# Patient Record
Sex: FEMALE | Race: WHITE | Employment: OTHER | URBAN - METROPOLITAN AREA
[De-identification: names, ages, dates, MRNs, and addresses within clinical notes are randomized per-mention and may not be internally consistent; named-entity substitution may affect disease eponyms.]

---

## 2017-01-03 ENCOUNTER — MEDICAL CORRESPONDENCE (OUTPATIENT)
Dept: HEALTH INFORMATION MANAGEMENT | Facility: CLINIC | Age: 77
End: 2017-01-03

## 2017-01-04 ENCOUNTER — TELEPHONE (OUTPATIENT)
Dept: NEUROLOGY | Facility: CLINIC | Age: 77
End: 2017-01-04

## 2017-01-04 ENCOUNTER — MEDICAL CORRESPONDENCE (OUTPATIENT)
Dept: HEALTH INFORMATION MANAGEMENT | Facility: CLINIC | Age: 77
End: 2017-01-04

## 2017-01-04 NOTE — TELEPHONE ENCOUNTER
----- Message from Sarah Trinh RN sent at 12/27/2016 12:15 PM CST -----  Regarding: [facility] is sending you a fax     Requesting order for WC  Please fill out and return   Thank you.

## 2017-01-05 ENCOUNTER — MEDICAL CORRESPONDENCE (OUTPATIENT)
Dept: HEALTH INFORMATION MANAGEMENT | Facility: CLINIC | Age: 77
End: 2017-01-05

## 2017-02-14 ENCOUNTER — TELEPHONE (OUTPATIENT)
Dept: NEUROLOGY | Facility: CLINIC | Age: 77
End: 2017-02-14

## 2017-02-14 ENCOUNTER — OFFICE VISIT (OUTPATIENT)
Dept: NEUROLOGY | Facility: CLINIC | Age: 77
End: 2017-02-14

## 2017-02-14 VITALS
SYSTOLIC BLOOD PRESSURE: 117 MMHG | DIASTOLIC BLOOD PRESSURE: 57 MMHG | HEART RATE: 84 BPM | WEIGHT: 201.2 LBS | BODY MASS INDEX: 31.58 KG/M2 | HEIGHT: 67 IN

## 2017-02-14 DIAGNOSIS — R41.89 COGNITIVE IMPAIRMENT: ICD-10-CM

## 2017-02-14 DIAGNOSIS — G20.A1 PARKINSON DISEASE (H): Primary | ICD-10-CM

## 2017-02-14 RX ORDER — CARBIDOPA AND LEVODOPA 25; 100 MG/1; MG/1
0.5 TABLET ORAL 4 TIMES DAILY
Qty: 180 TABLET | Refills: 3 | Status: SHIPPED
Start: 2017-02-14 | End: 2017-08-15

## 2017-02-14 RX ORDER — CARBIDOPA AND LEVODOPA 25; 100 MG/1; MG/1
0.5 TABLET ORAL 2 TIMES DAILY PRN
Qty: 180 TABLET | Refills: 3 | Status: SHIPPED
Start: 2017-02-14 | End: 2018-01-18

## 2017-02-14 RX ORDER — LUTEIN 6 MG
20 TABLET ORAL DAILY
Qty: 90 TABLET | Refills: 3 | COMMUNITY
Start: 2017-02-14 | End: 2018-11-15

## 2017-02-14 RX ORDER — MIRTAZAPINE 15 MG/1
7.5 TABLET, FILM COATED ORAL AT BEDTIME
Qty: 30 TABLET | COMMUNITY
Start: 2017-02-14 | End: 2018-11-15

## 2017-02-14 RX ORDER — RIVASTIGMINE 9.5 MG/24H
PATCH, EXTENDED RELEASE TRANSDERMAL
Qty: 30 PATCH | Refills: 11 | Status: ON HOLD | OUTPATIENT
Start: 2017-02-14 | End: 2018-03-02

## 2017-02-14 RX ORDER — RIVASTIGMINE 9.5 MG/24H
PATCH, EXTENDED RELEASE TRANSDERMAL
Qty: 30 PATCH | Refills: 11 | Status: SHIPPED
Start: 2017-02-14 | End: 2017-02-14

## 2017-02-14 RX ORDER — CARBIDOPA AND LEVODOPA 50; 200 MG/1; MG/1
1 TABLET, EXTENDED RELEASE ORAL EVERY 6 HOURS
Qty: 360 TABLET | Refills: 3 | Status: SHIPPED
Start: 2017-02-14 | End: 2017-08-16

## 2017-02-14 RX ORDER — CARBIDOPA AND LEVODOPA 50; 200 MG/1; MG/1
1 TABLET, EXTENDED RELEASE ORAL EVERY 6 HOURS
Qty: 360 TABLET | Refills: 3 | Status: SHIPPED | OUTPATIENT
Start: 2017-02-14 | End: 2017-02-14

## 2017-02-14 RX ORDER — CARBIDOPA AND LEVODOPA 25; 100 MG/1; MG/1
0.5 TABLET ORAL 4 TIMES DAILY
Qty: 180 TABLET | Refills: 3 | Status: SHIPPED | OUTPATIENT
Start: 2017-02-14 | End: 2017-02-14

## 2017-02-14 RX ORDER — RIVASTIGMINE 4.6 MG/24H
PATCH, EXTENDED RELEASE TRANSDERMAL
Qty: 30 PATCH | Refills: 11 | Status: SHIPPED | OUTPATIENT
Start: 2017-02-14 | End: 2017-03-28 | Stop reason: DRUGHIGH

## 2017-02-14 RX ORDER — CARBIDOPA AND LEVODOPA 25; 100 MG/1; MG/1
0.5 TABLET ORAL 2 TIMES DAILY PRN
Qty: 180 TABLET | Refills: 3 | Status: SHIPPED | OUTPATIENT
Start: 2017-02-14 | End: 2017-02-14

## 2017-02-14 RX ORDER — LUTEIN 6 MG
20 TABLET ORAL DAILY
COMMUNITY
Start: 2017-01-04 | End: 2017-02-14

## 2017-02-14 RX ORDER — RIVASTIGMINE 13.3 MG/24H
1 PATCH, EXTENDED RELEASE TRANSDERMAL DAILY
Qty: 30 PATCH | Refills: 11 | Status: SHIPPED | OUTPATIENT
Start: 2017-02-14 | End: 2017-08-15 | Stop reason: DRUGHIGH

## 2017-02-14 RX ORDER — DONEPEZIL HYDROCHLORIDE 5 MG/1
TABLET, FILM COATED ORAL
Qty: 30 TABLET | Refills: 11 | Status: SHIPPED | OUTPATIENT
Start: 2017-02-14 | End: 2017-03-28

## 2017-02-14 RX ORDER — ACETAMINOPHEN 325 MG/1
325 TABLET ORAL EVERY 4 HOURS PRN
Qty: 100 TABLET | COMMUNITY
Start: 2017-02-14 | End: 2019-05-23

## 2017-02-14 RX ORDER — RIVASTIGMINE 4.6 MG/24H
PATCH, EXTENDED RELEASE TRANSDERMAL
Qty: 30 PATCH | Refills: 11 | Status: SHIPPED | OUTPATIENT
Start: 2017-02-14 | End: 2017-02-14

## 2017-02-14 ASSESSMENT — PAIN SCALES - GENERAL: PAINLEVEL: NO PAIN (0)

## 2017-02-14 ASSESSMENT — ENCOUNTER SYMPTOMS
CONSTIPATION: 1
FATIGUE: 1
ARTHRALGIAS: 1
LEG PAIN: 1
DECREASED CONCENTRATION: 1
ALTERED TEMPERATURE REGULATION: 0
HEARTBURN: 0
INSOMNIA: 1
DIARRHEA: 0
HEADACHES: 1
HYPERTENSION: 0
POSTURAL DYSPNEA: 1
SPUTUM PRODUCTION: 0
WEAKNESS: 1
WEIGHT LOSS: 0
LEG SWELLING: 1
JOINT SWELLING: 0
RESPIRATORY PAIN: 0
STIFFNESS: 1
HEMOPTYSIS: 0
HEMATURIA: 0
DYSPNEA ON EXERTION: 1
SHORTNESS OF BREATH: 1
HYPOTENSION: 0
COUGH DISTURBING SLEEP: 0
TREMORS: 1
EXERCISE INTOLERANCE: 1
MYALGIAS: 1
DEPRESSION: 1
BOWEL INCONTINENCE: 0
POLYDIPSIA: 0
WEIGHT GAIN: 0
PANIC: 0
RECTAL PAIN: 0
VOMITING: 0
NERVOUS/ANXIOUS: 1
SLEEP DISTURBANCES DUE TO BREATHING: 1
PARALYSIS: 0
ABDOMINAL PAIN: 0
SPEECH CHANGE: 1
RECTAL BLEEDING: 1
CHILLS: 0
BLOOD IN STOOL: 0
DYSURIA: 0
NAUSEA: 0
LOSS OF CONSCIOUSNESS: 0
POLYPHAGIA: 0
BACK PAIN: 0
COUGH: 0
BLOATING: 0
SNORES LOUDLY: 1
FEVER: 0
MEMORY LOSS: 1
CLAUDICATION: 0
DIFFICULTY URINATING: 0
ORTHOPNEA: 1
LIGHT-HEADEDNESS: 1
SYNCOPE: 0
TACHYCARDIA: 0
SEIZURES: 0
MUSCLE WEAKNESS: 1
HALLUCINATIONS: 0
JAUNDICE: 0
FLANK PAIN: 0
PALPITATIONS: 0
NECK PAIN: 1
DIZZINESS: 1
TINGLING: 0
INCREASED ENERGY: 1
DISTURBANCES IN COORDINATION: 1
DECREASED APPETITE: 0
NIGHT SWEATS: 0
MUSCLE CRAMPS: 1
NUMBNESS: 0
WHEEZING: 0
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
## 2017-02-14 NOTE — MR AVS SNAPSHOT
After Visit Summary   2017    Eduarda Lazar    MRN: [id number]           Patient Information     Date Of Birth          1940        Visit Information        Provider Department      2017 9:10 AM Marcelo Cardoza MD [facility]        Today's Diagnoses     Parkinson disease (H)    -  1    Cognitive impairment          Care Instructions    PATIENT: Eduarda Lazar    : 1940    ANDREE: 2017    Parkinson  dbs    Sinemet CR 1 tablet @ 4am, 10am, 4pm and 10pm   Sinemet 25/100 1/2 tab @ 4am, 10am, 4pm and 10m and t tablet twice daily as needed.     Lutein    Ed visit 2016.    ramin Kruse, eduardo are visiting.  Aixa is 10 minutes away and visits a couple times per week -  and   Ramin is visiting a couple times per week  Eduardo is visiting as well.     She is here today with w/c for distances  She is using a walker.   She rarely uses the w/c in the nursing home.  She is getting more exercise there.   She has had problems grasping words and has lost her ability to use the phone or control the remote.     PLAN    1. Rivastigmine trial  Apply 4.6mg patch daily for 4 weeks then 9.5mg patch daily for 4 weeks then 13.3 mg patch daily    2. Consider aricept/donepezil if unable to cover the rivastigmine which may be better tolerated as a patch than the pills    3. Discussed lack of data for medical marijuana and that it is not one of the certifiable conditions.    4. Return to see raul in 6 months    5. dbs device is stable today.         Answers for HPI/ROS submitted by the patient on 2017   General Symptoms: Yes  Skin Symptoms: No  HENT Symptoms: No  EYE SYMPTOMS: No  HEART SYMPTOMS: Yes  LUNG SYMPTOMS: Yes  INTESTINAL SYMPTOMS: Yes  URINARY SYMPTOMS: Yes  GYNECOLOGIC SYMPTOMS: No  BREAST SYMPTOMS: No  SKELETAL SYMPTOMS: Yes  BLOOD SYMPTOMS: No  NERVOUS SYSTEM SYMPTOMS: Yes  MENTAL HEALTH SYMPTOMS: Yes  Fever: No  Loss of appetite:  No  Weight loss: No  Weight gain: No  Fatigue: Yes  Night sweats: No  Chills: No  Increased stress: Yes  Excessive hunger: No  Excessive thirst: No  Feeling hot or cold when others believe the temperature is normal: No  Loss of height: No  Post-operative complications: No  Surgical site pain: No  Hallucinations: No  Change in or Loss of Energy: Yes  Hyperactivity: No  Confusion: Yes  Cough: No  Sputum or phlegm: No  Coughing up blood: No  Difficulty breating or shortness of breath: Yes  Snoring: Yes  Wheezing: No  Difficulty breathing on exertion: Yes  Respiratory pain: No  Nighttime Cough: No  Difficulty breathing when lying flat: Yes  Chest pain or pressure: No  Fast or irregular heartbeat: No  Pain in legs with walking: Yes  Swelling in feet or ankles: Yes  Trouble breathing while lying down: Yes  Fingers or Toes appear blue: No  High blood pressure: No  Low blood pressure: No  Fainting: No  Murmurs: No  Chest pain on exertion: No  Chest pain at rest: No  Cramping pain in leg during exercise: No  Pacemaker: No  Varicose veins: No  Edema or swelling: Yes  Fast heart beat: No  Wake up at night with shortness of breath: Yes  Heart flutters: No  Light-headedness: Yes  Exercise intolerance: Yes  Heart burn or indigestion: No  Nausea: No  Vomiting: No  Abdominal pain: No  Bloating: No  Constipation: Yes  Diarrhea: No  Blood in stool: No  Black stools: No  Rectal or Anal pain: No  Fecal incontinence: No  Rectal bleeding: Yes  Yellowing of skin or eyes: No  Vomit with blood: No  Change in stools: No  Hemorrhoids: Yes  Trouble holding urine or incontinence: Yes  Pain or burning: No  Trouble starting or stopping: Yes  Increased frequency of urination: Yes  Blood in urine: No  Decreased frequency of urination: No  Frequent nighttime urination: Yes  Flank pain: No  Difficulty emptying bladder: No  Back pain: No  Muscle aches: Yes  Neck pain: Yes  Swollen joints: No  Joint pain: Yes  Bone pain: No  Muscle cramps: Yes  Muscle  weakness: Yes  Joint stiffness: Yes  Bone fracture: No  Trouble with coordination: Yes  Dizziness or trouble with balance: Yes  Fainting or black-out spells: No  Memory loss: Yes  Headache: Yes  Seizures: No  Speech problems: Yes  Tingling: No  Tremor: Yes  Weakness: Yes  Difficulty walking: Yes  Paralysis: No  Numbness: No  Nervous or Anxious: Yes  Depression: Yes  Trouble sleeping: Yes  Trouble thinking or concentrating: Yes  Mood changes: Yes  Panic attacks: No          Follow-ups after your visit        Follow-up notes from your care team     Return in about 6 months (around 8/14/2017).      Your next 10 appointments already scheduled     Aug 15, 2017 11:15 AM CDT   (Arrive by 11:00 AM)   Return Movement Disorder with MIRZA Patel [facility] ([facility])    [address] [place] [id number] [phone]              Who to contact     Please call your clinic at [phone] to:    Ask questions about your health    Make or cancel appointments    Discuss your medicines    Learn about your test results    Speak to your doctor   If you have compliments or concerns about an experience at your clinic, or if you wish to file a complaint, please contact [facility] Patient Relations at [phone] or email us at [email or web address]         Additional Information About Your Visit        MyChart Information     [employer] gives you secure access to your electronic health record. If you see a primary care provider, you can also send messages to your care team and make appointments. If you have questions, please call your primary care clinic.  If you do not have a primary care provider, please call [phone] and they will assist you.      [employer] is an electronic gateway that provides easy, online access to your medical records. With [employer], you can request a clinic appointment, read your test results,  "renew a prescription or communicate with your care team.     To access your existing account, please contact your [facility] or call [phone] for assistance.        Care EveryWhere ID     This is your Care EveryWhere ID. This could be used by other organizations to access your [facility] medical records  [id number]        Your Vitals Were     Pulse Height BMI (Body Mass Index)             84 1.702 m (5' 7\") 31.51 kg/m2          Blood Pressure from Last 3 Encounters:   02/14/17 117/57   11/15/16 141/69   08/09/16 128/63    Weight from Last 3 Encounters:   02/14/17 91.3 kg (201 lb 3.2 oz)   11/12/16 98.9 kg (218 lb)   04/14/16 98 kg (216 lb 1.6 oz)              Today, you had the following     No orders found for display         Today's Medication Changes          These changes are accurate as of: 2/14/17  9:33 AM.  If you have any questions, ask your nurse or doctor.               Start taking these medicines.        Dose/Directions    * carbidopa-levodopa  MG per CR tablet   Commonly known as:  SINEMET CR   Used for:  Parkinson disease (H)   Started by:  Marcelo Cardoza MD        Dose:  1 tablet   Take 1 tablet by mouth every 6 hours At 4am, 10am, 4pm, and 10pm   Quantity:  360 tablet   Refills:  3       * carbidopa-levodopa  MG per tablet   Commonly known as:  SINEMET   Used for:  Parkinson disease (H)   Started by:  Marcelo Cardoza MD        Dose:  0.5 tablet   Take 0.5 tablets by mouth 4 times daily At 4am, 10am, 4pm, and 10pm.   Quantity:  180 tablet   Refills:  3       * carbidopa-levodopa  MG per tablet   Commonly known as:  SINEMET   Used for:  Parkinson disease (H)   Started by:  Marcelo Cardoza MD        Dose:  0.5 tablet   Take 0.5 tablets by mouth 2 times daily as needed for tremors   Quantity:  180 tablet   Refills:  3       donepezil 5 MG tablet   Commonly known as:  ARICEPT   Used for:  Parkinson disease (H), Cognitive impairment "   Started by:  Marcelo Cardoza MD        5mg daily for 4 weeks then 10mg daily   Quantity:  30 tablet   Refills:  11       * rivastigmine 13.3 MG/24HR 24 hr patch   Commonly known as:  EXELON   Used for:  Parkinson disease (H), Cognitive impairment   Started by:  Marceol Cardoza MD        Dose:  1 patch   Place 1 patch onto the skin daily Apply 4.6mg patch daily for 4 weeks then 9.5mg patch daily for 4 weeks then 13.3 mg patch daily   Quantity:  30 patch   Refills:  11       * rivastigmine 4.6 MG/24HR 24 hr patch   Commonly known as:  EXELON   Used for:  Parkinson disease (H), Cognitive impairment   Started by:  Marcelo Cardoza MD        Apply 4.6mg patch daily for 4 weeks then 9.5mg patch daily for 4 weeks then 13.3 mg patch daily   Quantity:  30 patch   Refills:  11       * rivastigmine 9.5 MG/24HR 24 hr patch   Commonly known as:  EXELON   Used for:  Parkinson disease (H), Cognitive impairment   Started by:  Marcelo Cardoza MD        Apply 4.6mg patch daily for 4 weeks then 9.5mg patch daily for 4 weeks then 13.3 mg patch daily   Quantity:  30 patch   Refills:  11       * Notice:  This list has 6 medication(s) that are the same as other medications prescribed for you. Read the directions carefully, and ask your doctor or other care provider to review them with you.      These medicines have changed or have updated prescriptions.        Dose/Directions    acetaminophen 325 MG tablet   Commonly known as:  TYLENOL   This may have changed:  medication strength   Changed by:  Marcelo Cardoza MD        Dose:  325 mg   Take 1 tablet (325 mg) by mouth every 4 hours as needed for pain   Quantity:  100 tablet   Refills:  0       Lutein 20 MG Tabs   This may have changed:  Another medication with the same name was removed. Continue taking this medication, and follow the directions you see here.   Changed by:  Marcelo Cardoza MD        Dose:  20 mg   Take 20 mg by mouth daily   Quantity:  90 tablet    Refills:  3            Where to get your medicines      These medications were sent to [facility] - [place], [place] - [address]  [address], [facility] [address]     Phone:  [phone]     carbidopa-levodopa  MG per tablet    carbidopa-levodopa  MG per tablet    carbidopa-levodopa  MG per CR tablet         Some of these will need a paper prescription and others can be bought over the counter.  Ask your nurse if you have questions.     Bring a paper prescription for each of these medications     donepezil 5 MG tablet    rivastigmine 13.3 MG/24HR 24 hr patch    rivastigmine 4.6 MG/24HR 24 hr patch    rivastigmine 9.5 MG/24HR 24 hr patch                Primary Care Provider Office Phone # Fax #    Michael Chaves [phone]       [facility] [address] [facility] [address]        Thank you!     Thank you for choosing [facility]  for your care. Our goal is always to provide you with excellent care. Hearing back from our patients is one way we can continue to improve our services. Please take a few minutes to complete the written survey that you may receive in the mail after your visit with us. Thank you!             Your Updated Medication List - Protect others around you: Learn how to safely use, store and throw away your medicines at www.disposemymeds.org.          This list is accurate as of: 2/14/17  9:33 AM.  Always use your most recent med list.                   Brand Name Dispense Instructions for use    acetaminophen 325 MG tablet    TYLENOL    100 tablet    Take 1 tablet (325 mg) by mouth every 4 hours as needed for pain       * carbidopa-levodopa  MG per CR tablet    SINEMET CR    360 tablet    Take 1 tablet by mouth every 6 hours At 4am, 10am, 4pm, and 10pm       * carbidopa-levodopa  MG per tablet    SINEMET    180 tablet    Take 0.5 tablets by mouth 4 times daily At 4am,  10am, 4pm, and 10pm.       * carbidopa-levodopa  MG per tablet    SINEMET    180 tablet    Take 0.5 tablets by mouth 2 times daily as needed for tremors       donepezil 5 MG tablet    ARICEPT    30 tablet    5mg daily for 4 weeks then 10mg daily       Lutein 20 MG Tabs     90 tablet    Take 20 mg by mouth daily       REMERON 15 MG tablet   Generic drug:  mirtazapine     30 tablet    Take 0.5 tablets (7.5 mg) by mouth At Bedtime       * rivastigmine 13.3 MG/24HR 24 hr patch    EXELON    30 patch    Place 1 patch onto the skin daily Apply 4.6mg patch daily for 4 weeks then 9.5mg patch daily for 4 weeks then 13.3 mg patch daily       * rivastigmine 4.6 MG/24HR 24 hr patch    EXELON    30 patch    Apply 4.6mg patch daily for 4 weeks then 9.5mg patch daily for 4 weeks then 13.3 mg patch daily       * rivastigmine 9.5 MG/24HR 24 hr patch    EXELON    30 patch    Apply 4.6mg patch daily for 4 weeks then 9.5mg patch daily for 4 weeks then 13.3 mg patch daily       * Notice:  This list has 6 medication(s) that are the same as other medications prescribed for you. Read the directions carefully, and ask your doctor or other care provider to review them with you.

## 2017-02-14 NOTE — TELEPHONE ENCOUNTER
Interrogated patient's DBS Activa SC battery. Patient has LSTN. All impedances were checked at 3.0 Volts and were WNL. See [employer] Neuromodulation sheets scanned. Patient informed.    Therapy impedance = 1015 ohms, 2.567 mA    Using contacts = C+/1-    Model 49554  [id number]    Battery voltage 2.85      MRN [id number]  Diagnosis Parkinson's  ------------------------------  Interrogated patient's DBS Activa SC battery. Patient has RSTN. All impedances were checked at 3.0 Volts and were WNL. See [employer] Neuromodulation sheets scanned. Patient informed.    Therapy impedance = 1133 ohms, 2.462 mA    Using contacts = 2+/3-    Model 54742  [id number]    Battery voltage 2.96    This side is incorrectly labeled as Left STN also - This is the RIGHT STN.

## 2017-02-14 NOTE — NURSING NOTE
Chief Complaint   Patient presents with     RECHECK     P RETURN MOVEMENT DISORDER     Claire Iraheta MA
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
## 2017-02-14 NOTE — PATIENT INSTRUCTIONS
PATIENT: Eduarda Lazar    : 1940    ANDREE: 2017    Parkinson  dbs    Sinemet CR 1 tablet @ 4am, 10am, 4pm and 10pm   Sinemet 25/100 1/2 tab @ 4am, 10am, 4pm and 10m and t tablet twice daily as needed.     Lutein    Ed visit 2016.    Aixa, champ, eduardo are visiting.  Aixa is 10 minutes away and visits a couple times per week -  and   Oberlin is visiting a couple times per week  Eduardo is visiting as well.     She is here today with w/c for distances  She is using a walker.   She rarely uses the w/c in the nursing home.  She is getting more exercise there.   She has had problems grasping words and has lost her ability to use the phone or control the remote.     PLAN    1. Rivastigmine trial  Apply 4.6mg patch daily for 4 weeks then 9.5mg patch daily for 4 weeks then 13.3 mg patch daily    2. Consider aricept/donepezil if unable to cover the rivastigmine which may be better tolerated as a patch than the pills    3. Discussed lack of data for medical marijuana and that it is not one of the certifiable conditions.    4. Return to see raul in 6 months    5. dbs device is stable today.         Answers for HPI/ROS submitted by the patient on 2017   General Symptoms: Yes  Skin Symptoms: No  HENT Symptoms: No  EYE SYMPTOMS: No  HEART SYMPTOMS: Yes  LUNG SYMPTOMS: Yes  INTESTINAL SYMPTOMS: Yes  URINARY SYMPTOMS: Yes  GYNECOLOGIC SYMPTOMS: No  BREAST SYMPTOMS: No  SKELETAL SYMPTOMS: Yes  BLOOD SYMPTOMS: No  NERVOUS SYSTEM SYMPTOMS: Yes  MENTAL HEALTH SYMPTOMS: Yes  Fever: No  Loss of appetite: No  Weight loss: No  Weight gain: No  Fatigue: Yes  Night sweats: No  Chills: No  Increased stress: Yes  Excessive hunger: No  Excessive thirst: No  Feeling hot or cold when others believe the temperature is normal: No  Loss of height: No  Post-operative complications: No  Surgical site pain: No  Hallucinations: No  Change in or Loss of Energy: Yes  Hyperactivity: No  Confusion:  Yes  Cough: No  Sputum or phlegm: No  Coughing up blood: No  Difficulty breating or shortness of breath: Yes  Snoring: Yes  Wheezing: No  Difficulty breathing on exertion: Yes  Respiratory pain: No  Nighttime Cough: No  Difficulty breathing when lying flat: Yes  Chest pain or pressure: No  Fast or irregular heartbeat: No  Pain in legs with walking: Yes  Swelling in feet or ankles: Yes  Trouble breathing while lying down: Yes  Fingers or Toes appear blue: No  High blood pressure: No  Low blood pressure: No  Fainting: No  Murmurs: No  Chest pain on exertion: No  Chest pain at rest: No  Cramping pain in leg during exercise: No  Pacemaker: No  Varicose veins: No  Edema or swelling: Yes  Fast heart beat: No  Wake up at night with shortness of breath: Yes  Heart flutters: No  Light-headedness: Yes  Exercise intolerance: Yes  Heart burn or indigestion: No  Nausea: No  Vomiting: No  Abdominal pain: No  Bloating: No  Constipation: Yes  Diarrhea: No  Blood in stool: No  Black stools: No  Rectal or Anal pain: No  Fecal incontinence: No  Rectal bleeding: Yes  Yellowing of skin or eyes: No  Vomit with blood: No  Change in stools: No  Hemorrhoids: Yes  Trouble holding urine or incontinence: Yes  Pain or burning: No  Trouble starting or stopping: Yes  Increased frequency of urination: Yes  Blood in urine: No  Decreased frequency of urination: No  Frequent nighttime urination: Yes  Flank pain: No  Difficulty emptying bladder: No  Back pain: No  Muscle aches: Yes  Neck pain: Yes  Swollen joints: No  Joint pain: Yes  Bone pain: No  Muscle cramps: Yes  Muscle weakness: Yes  Joint stiffness: Yes  Bone fracture: No  Trouble with coordination: Yes  Dizziness or trouble with balance: Yes  Fainting or black-out spells: No  Memory loss: Yes  Headache: Yes  Seizures: No  Speech problems: Yes  Tingling: No  Tremor: Yes  Weakness: Yes  Difficulty walking: Yes  Paralysis: No  Numbness: No  Nervous or Anxious: Yes  Depression: Yes  Trouble  sleeping: Yes  Trouble thinking or concentrating: Yes  Mood changes: Yes  Panic attacks: No

## 2017-03-23 ENCOUNTER — TELEPHONE (OUTPATIENT)
Dept: NEUROLOGY | Facility: CLINIC | Age: 77
End: 2017-03-23

## 2017-03-23 NOTE — TELEPHONE ENCOUNTER
----- Message from Chelsea Hare LPN sent at 3/23/2017 11:41 AM CDT -----  Regarding: FW: new sx- sinemet clarification      ----- Message -----     From: Marcelo Cardoza MD     Sent: 3/21/2017  12:38 PM       To: MIRZA Patel CNP, #  Subject: RE: new sx- sinemet clarification                Is she on the rivastigmine patch or donepezil.  Has her changes occurred in relationship to this medication which may have been started when seen one month ago?    ----- Message -----     From: Chelsea Hare LPN     Sent: 3/21/2017  11:08 AM       To: Marcelo Cardoza MD, Alla Barbour RN  Subject: new sx- sinemet clarification                    Caller name:Marti Barnett [phone]    Treating provider/specialty:Nani    Nurse:    Best time to return call:    Message left? [facility] fax for:  carbidopa-levodopa (SINEMET)  MG per tablet 180 tablet 3 2/14/2017  No  Sig: Take 0.5 tablets by mouth 4 times daily At 4am, 10am, 4pm, and 10pm.    Is she still supposed to be taking above? I told her yes.  Call if any changes needed.    Description of issue/question:  Sx-more tremors since Jan. Had a freezing episode about a wk ago.  She has been going back and forth from [facility] to [facility] the past 6 mons.

## 2017-03-23 NOTE — TELEPHONE ENCOUNTER
LVM message for Marie at [facility] to return call to see if patient is on rivastigmine patch or donepezil.

## 2017-03-24 NOTE — TELEPHONE ENCOUNTER
LVM message for Marie to return call to confirm if patient is on rivastigmine patch or donepezil.

## 2017-03-24 NOTE — TELEPHONE ENCOUNTER
Marie is calling today to report that pt is taking the Exelon patch 9.5 MG and is not taking donepezil.      Will route FYI to Sarah BONILLA

## 2017-03-24 NOTE — TELEPHONE ENCOUNTER
----- Message from Sarah Trinh RN sent at 3/23/2017  3:15 PM CDT -----  Marie from [facility] calling to report pt is taking the following:  carbidopa-levodopa (SINEMET CR)  MG per CR tablet 360 tablet 3 2/14/2017  No  Sig: Take 1 tablet by mouth every 6 hours At 4am, 10am, 4pm, and 10pm  Class: Fax       Disp Refills Start End ANGIE  carbidopa-levodopa (SINEMET)  MG per tablet 180 tablet 3 2/14/2017  No  Sig: Take 0.5 tablets by mouth 4 times daily At 4am, 10am, 4pm, and 10pm.  Class: Fax    And C/L 1/2 tablet as needed 2 times a day for tremors.       Please call Marie at [phone] if questions.  Thanks.

## 2017-03-28 DIAGNOSIS — G20.A1 PARALYSIS AGITANS (H): Primary | ICD-10-CM

## 2017-08-15 ENCOUNTER — OFFICE VISIT (OUTPATIENT)
Dept: NEUROLOGY | Facility: CLINIC | Age: 77
End: 2017-08-15

## 2017-08-15 VITALS
HEIGHT: 67 IN | DIASTOLIC BLOOD PRESSURE: 51 MMHG | HEART RATE: 71 BPM | BODY MASS INDEX: 30.26 KG/M2 | SYSTOLIC BLOOD PRESSURE: 104 MMHG | WEIGHT: 192.8 LBS

## 2017-08-15 DIAGNOSIS — G20.A1 PARKINSON'S DISEASE (H): Primary | ICD-10-CM

## 2017-08-15 DIAGNOSIS — R41.89 COGNITIVE IMPAIRMENT: ICD-10-CM

## 2017-08-15 RX ORDER — CARBIDOPA AND LEVODOPA 25; 100 MG/1; MG/1
TABLET ORAL
Qty: 270 TABLET | Refills: 3 | Status: SHIPPED | OUTPATIENT
Start: 2017-08-15 | End: 2017-08-15

## 2017-08-15 RX ORDER — CARBIDOPA AND LEVODOPA 25; 100 MG/1; MG/1
TABLET ORAL
Qty: 270 TABLET | Refills: 3 | Status: SHIPPED | OUTPATIENT
Start: 2017-08-15 | End: 2017-08-16

## 2017-08-15 ASSESSMENT — PAIN SCALES - GENERAL: PAINLEVEL: MODERATE PAIN (5)

## 2017-08-15 NOTE — NURSING NOTE
Chief Complaint   Patient presents with     RECHECK     P RETURN - MOVEMENT DISORDER     Claire Iraheta MA

## 2017-08-15 NOTE — MR AVS SNAPSHOT
"              After Visit Summary   8/15/2017    Eduarda Lazar    MRN: [id number]           Patient Information     Date Of Birth          1940        Visit Information        Provider Department      8/15/2017 11:15 AM Clinton Zepeda APRN CNP [facility]        Today's Diagnoses     Parkinson's disease (H)    -  1      Care Instructions    August 15, 2017    Dear Ms. Eduarda Lazar,    Thank you for coming today.  During your visit, we have discussed the following:     __  Will increase short acting Sinemet 25/100 mg slightly to improve \"tremor.\"  It's unclear if extra movements  are cause by the side effect of Sinemet (too much) or wearing off (not enough Sinemet).  Will try this for now & monitor: -     __  Take Sinemet 25/100 mg 1/2 tab at 4 am, 1 tablet at 10 am, & 4 pm, and 1/2 tab 10 pm.    __  Please give ALL Sinemet doses ON TIME.  It's important that pt takes dose 1 hour before meals.  If the scheduled times are difficult for nursing staff to administer at the times suggested, call our clinic so that we can come up with a plan.      __  The PRN Sinemet dose shouldn't be given with scheduled Sinemet dose.  It can be given in between doses.      __  Return in 3 months. You may return sooner as needed.      For questions, you may send us a [employer] message or call [phone]    Fax number: [phone]    MIRZA Donnelly, CNP  [facility]            Follow-ups after your visit        Your next 10 appointments already scheduled     Nov 17, 2017  2:15 PM CST   (Arrive by 2:00 PM)   Return Movement Disorder with Marcelo Cardoza MD   [facility] ([facility])    [address] [address] [phone]              Who to contact     Please call your clinic at [phone] to:    Ask questions about your health    Make or cancel appointments    Discuss your medicines    Learn about your test " "results    Speak to your doctor   If you have compliments or concerns about an experience at your clinic, or if you wish to file a complaint, please contact [facility] Patient Relations at [phone] or email us at [email or web address]         Additional Information About Your Visit        KeepIdeashart Information     ObjectLabst gives you secure access to your electronic health record. If you see a primary care provider, you can also send messages to your care team and make appointments. If you have questions, please call your primary care clinic.  If you do not have a primary care provider, please call [phone] and they will assist you.      Corous360 is an electronic gateway that provides easy, online access to your medical records. With Corous360, you can request a clinic appointment, read your test results, renew a prescription or communicate with your care team.     To access your existing account, please contact your [facility] or call [phone] for assistance.        Care EveryWhere ID     This is your Care EveryWhere ID. This could be used by other organizations to access your [place] medical records  [id number]        Your Vitals Were     Pulse Height BMI (Body Mass Index)             71 1.702 m (5' 7\") 30.2 kg/m2          Blood Pressure from Last 3 Encounters:   08/15/17 104/51   02/14/17 117/57   11/15/16 141/69    Weight from Last 3 Encounters:   08/15/17 87.5 kg (192 lb 12.8 oz)   02/14/17 91.3 kg (201 lb 3.2 oz)   11/12/16 98.9 kg (218 lb)              Today, you had the following     No orders found for display         Today's Medication Changes          These changes are accurate as of: 8/15/17 12:17 PM.  If you have any questions, ask your nurse or doctor.               These medicines have changed or have updated prescriptions.        Dose/Directions    * carbidopa-levodopa  MG per CR tablet   Commonly known as:  SINEMET CR "   This may have changed:  Another medication with the same name was added. Make sure you understand how and when to take each.   Used for:  Parkinson disease (H)   Changed by:  Marcelo Cardoza MD        Dose:  1 tablet   Take 1 tablet by mouth every 6 hours At 4am, 10am, 4pm, and 10pm   Quantity:  360 tablet   Refills:  3       * carbidopa-levodopa  MG per tablet   Commonly known as:  SINEMET   This may have changed:  Another medication with the same name was added. Make sure you understand how and when to take each.   Used for:  Parkinson disease (H)   Changed by:  Marcelo Cardoza MD        Dose:  0.5 tablet   Take 0.5 tablets by mouth 2 times daily as needed for tremors   Quantity:  180 tablet   Refills:  3       * carbidopa-levodopa  MG per tablet   Commonly known as:  SINEMET   This may have changed:  You were already taking a medication with the same name, and this prescription was added. Make sure you understand how and when to take each.   Used for:  Parkinson's disease (H)   Changed by:  Clinton Zepeda APRN CNP        Take 1/2 tab at 4 am, 1 tablet at 10 am, & 4 pm, and 1/2 tab 10 pm.   Quantity:  270 tablet   Refills:  3       rivastigmine 9.5 MG/24HR 24 hr patch   Commonly known as:  EXELON   This may have changed:  Another medication with the same name was removed. Continue taking this medication, and follow the directions you see here.   Used for:  Parkinson disease (H), Cognitive impairment   Changed by:  Marcelo Cardoza MD        Apply 4.6mg patch daily for 4 weeks then 9.5mg patch daily for 4 weeks then 13.3 mg patch daily   Quantity:  30 patch   Refills:  11       * Notice:  This list has 3 medication(s) that are the same as other medications prescribed for you. Read the directions carefully, and ask your doctor or other care provider to review them with you.         Where to get your medicines      Some of these will need a paper prescription and others can be bought  over the counter.  Ask your nurse if you have questions.     Bring a paper prescription for each of these medications     carbidopa-levodopa  MG per tablet                Primary Care Provider Office Phone # Fax #    Michael Chaves [phone]       [facility] [address]  [facility] [address]        Equal Access to Services     [facility]: Hadii aad ku hadasho Soomaali, waaxda luqadaha, qaybta kaalmada adeegyada, waxay idiin hayaan adeeg khdiontesh laShankarmazinn ah. So [facility] [phone].    ATENCIÓN: Si habla español, tiene a art disposición servicios gratuitos de asistencia lingüística. [facility] [phone].    We comply with applicable federal civil rights laws and [place] laws. We do not discriminate on the basis of race, color, national origin, age, disability sex, sexual orientation or gender identity.            Thank you!     Thank you for choosing [facility]  for your care. Our goal is always to provide you with excellent care. Hearing back from our patients is one way we can continue to improve our services. Please take a few minutes to complete the written survey that you may receive in the mail after your visit with us. Thank you!             Your Updated Medication List - Protect others around you: Learn how to safely use, store and throw away your medicines at www.disposemymeds.org.          This list is accurate as of: 8/15/17 12:17 PM.  Always use your most recent med list.                   Brand Name Dispense Instructions for use Diagnosis    acetaminophen 325 MG tablet    TYLENOL    100 tablet    Take 1 tablet (325 mg) by mouth every 4 hours as needed for pain        * carbidopa-levodopa  MG per CR tablet    SINEMET CR    360 tablet    Take 1 tablet by mouth every 6 hours At 4am, 10am, 4pm, and 10pm    Parkinson disease (H)       * carbidopa-levodopa  MG per tablet    SINEMET    180 tablet    Take 0.5 tablets by mouth 2 times daily  as needed for tremors    Parkinson disease (H)       * carbidopa-levodopa  MG per tablet    SINEMET    270 tablet    Take 1/2 tab at 4 am, 1 tablet at 10 am, & 4 pm, and 1/2 tab 10 pm.    Parkinson's disease (H)       Lutein 20 MG Tabs     90 tablet    Take 20 mg by mouth daily        REMERON 15 MG tablet   Generic drug:  mirtazapine     30 tablet    Take 0.5 tablets (7.5 mg) by mouth At Bedtime        rivastigmine 9.5 MG/24HR 24 hr patch    EXELON    30 patch    Apply 4.6mg patch daily for 4 weeks then 9.5mg patch daily for 4 weeks then 13.3 mg patch daily    Parkinson disease (H), Cognitive impairment       * Notice:  This list has 3 medication(s) that are the same as other medications prescribed for you. Read the directions carefully, and ask your doctor or other care provider to review them with you.

## 2017-08-15 NOTE — PROGRESS NOTES
"PATIENT: Eduarda Lazar    : 1940    ANDREE: August 15, 2017    REASON FOR VISIT: Parkinson's disease (PD) follow up & DBS interrogation & analysis.     HPI: Ms. Eduarda Lazar is a 77 year old  female who came to the [facility] neurology clinic accompanied by her son for a follow up visit. Her daughter, Toshia was attending the visit via telephone.  She was last seen in the clinic on 2017 by Dr. Cardoza for a routine f/u visit.  During that visit, the following were discussed: -    PLAN     1. Rivastigmine trial Apply 4.6mg patch daily for 4 weeks then 9.5mg patch daily for 4 weeks then 13.3 mg patch daily     2. Consider aricept/donepezil if unable to cover the rivastigmine which may be better tolerated as a patch than the pills     3. Discussed lack of data for medical marijuana and that it is not one of the certifiable conditions.     4. Return to see Margarita in 6 months     5. DBS device is stable today.     Since 2016, pt has been living at [facility].  She was falling at home & had to go to a care facility.      About 3 weeks ago, pt had a visit with a dentist & had her tooth pulled tooth.  Since then, there is an overall decline in motor & cognition.  Pt & daughter report that tremors & memory have worsened.  \"She shuffles more.\" Pt reports dyskinesias in Rt shoulder is worse.  It's unclear when dyskinesias occur in relation to her medications.  When pt was asked, she verbalized how staff wasn't consistent in giving her medications on time & avoiding food.  Her son reports that when he called her about a week or so ago, she didn't know who he was.     Since her last visit, she was on Rivastigmine for about 4 months.  She was using 13.3 mg patch.  After pt complained that her skin was itching & her memory didn't improve, the patch was discontinued.  Daughter reports that pt is frustrated & angry that she can't make changes in her medications like she did before.  \"She is " "crabby at staff when they tell her that she can't walk by herself.\"  About a week ago, Rivastigmine 4.6 mg patch was restarted.      She is not walking & exercising due to weakness & unsteady gait.     MEDICATIONS:   Medication Sig     carbidopa-levodopa (SINEMET)  MG  Take 1/2 tab at 4 am, 1 tablet at 10 am, & 4 pm, and 1/2 tab 10 pm.     acetaminophen (TYLENOL) 325 MG Take 1 tablet (325 mg) by mouth every 4 hours as needed for pain     mirtazapine (REMERON) 15 MG Take 0.5 tablets (7.5 mg) by mouth At Bedtime     carbidopa-levodopa (SINEMET CR)  MG per CR tablet Take 1 tablet by mouth every 6 hours At 4am, 10am, 4pm, and 10pm     carbidopa-levodopa (SINEMET)  MG Take 0.5 tablets by mouth 2 times daily as needed for tremors     Lutein 20 MG TABS Take 20 mg by mouth daily     rivastigmine (EXELON) 9.5 MG/24HR 24 hr patch Apply 4.6mg patch daily for 4 weeks then 9.5mg patch daily for 4 weeks then 13.3 mg patch daily       ALLERGIES: Barbiturates; Camphor; Comtan; Oxycodone; Vancomycin; Clindamycin hcl; and Sulfa drugs    PHYSICAL EXAM:    VITAL SIGNS:  Blood pressure 104/51, pulse 71, height 1.702 m (5' 7\"), weight 87.5 kg (192 lb 12.8 oz).  Body mass index is 30.2 kg/(m^2).    GENERAL:  Ms. Lazar is a pleasant  female who is well-groomed, well-developed and overweight sitting comfortably in the exam room without any distress.  Affect is appropriate.    MOVEMENT DISORDERS ASSESSMENT:   Speech: Slight loss of expression and volume. Monotone.  Facial Expression: Slight, abnormal diminution of facial expression.  Rest Tremor: Absent.   Dyskinesias:  Mild & present most of the time.  A little worse with mental activation.     DBS Interrogation & Analysis:    Implanted generator:  Bilateral chest Activa-SC.  Lead placement:  Bilateral Subthalamic Nucleus (STN).    Left Brain    Therapy On:  100%  Battery Service Life:  OK.  2.80 volts.    C +, 1 -  Volts: 2.6 volts  PW: 120 msec  Rate: 160 " "Hz    Electrode Impedance Check: (Amplitude 3.0 volts: PW 80 msec: Rate 100 Hz)   Left Lead: No Problems Found.     Right Brain (this was labeled at Left STN)  2 +, 3 -  Volts: 2.8 volts  PW: 90 msec  Rate: 185 Hz    Therapy On:  100%  Battery Service Life:  OK.  2.94 volts.    Electrode Impedance Check:  (Amplitude 3.0 volts: PW 80 msec: Rate 100 Hz)   Right Lead: No Problems Found.    See scanned report for impedance details.    DBS PROGRAMMING:  Right STN was labeled as \"Left STN.\" This was corrected.  __  Left STN PW was reduced form 120 msec to 60 msec to see if Rt shoulder dyskinesias improved.  It might have improved, but hard to tell as it still occurred intermittently.     ASSESSMENT/PLAN:    1. Parkinson's Disease:  Patient has a long - standing history of PD.  She is s/p bilateral DBS implantation.  As mentioned above, she has motor & cognitive decline.  Pt & family reports tremors & Rt shoulder dyskinesias, but it's unclear when symptoms occur in relation to her Sinemet dose.   The following instruction was sent to nursing home: -    __  Will increase short acting Sinemet 25/100 mg slightly to improve \"tremor.\"  It's unclear if extra movements  are cause by the side effect of Sinemet (too much) or wearing off (not enough Sinemet).  Will try this for now & monitor.   __  Take Sinemet 25/100 mg 1/2 tab at 4 am, 1 tablet at 10 am, & 4 pm, and 1/2 tab 10 pm.  __  Please give ALL Sinemet doses ON TIME.  It's important that pt takes dose 1 hour before meals.  If the scheduled times are difficult for nursing staff to administer at the times suggested, call our clinic so that we can come up with a plan.    __  The PRN Sinemet dose shouldn't be given with scheduled Sinemet dose.  It can be given in between doses.    __  Return in 3 months. You may return sooner as needed.    2.  Cognitive decline:  Rivastigmine has been resumed.  Will continue on this.     The total time spent with the patient was 45 minutes; 20 " minutes in DBS Programmin minutes in addressing PD & cognitive decline; greater than 50% of this time was spent in counseling and coordination of care.    MIRZA Donnelly,  CNP  [facility]

## 2017-08-15 NOTE — PATIENT INSTRUCTIONS
"August 15, 2017    Dear Ms. Eduarda Lazar,    Thank you for coming today.  During your visit, we have discussed the following:     __  Will increase short acting Sinemet 25/100 mg slightly to improve \"tremor.\"  It's unclear if extra movements  are cause by the side effect of Sinemet (too much) or wearing off (not enough Sinemet).  Will try this for now & monitor: -     __  Take Sinemet 25/100 mg 1/2 tab at 4 am, 1 tablet at 10 am, & 4 pm, and 1/2 tab 10 pm.    __  Please give ALL Sinemet doses ON TIME.  It's important that pt takes dose 1 hour before meals.  If the scheduled times are difficult for nursing staff to administer at the times suggested, call our clinic so that we can come up with a plan.      __  The PRN Sinemet dose shouldn't be given with scheduled Sinemet dose.  It can be given in between doses.      __  Return in 3 months. You may return sooner as needed.      For questions, you may send us a [employer] message or call [phone]    Fax number: [phone]    MIRZA Donnelly, CNP  [facility]    "

## 2017-08-16 ENCOUNTER — TELEPHONE (OUTPATIENT)
Dept: NEUROLOGY | Facility: CLINIC | Age: 77
End: 2017-08-16

## 2017-08-16 DIAGNOSIS — G20.A1 PARKINSON DISEASE (H): ICD-10-CM

## 2017-08-16 DIAGNOSIS — G20.A1 PARKINSON'S DISEASE (H): ICD-10-CM

## 2017-08-16 DIAGNOSIS — R41.89 COGNITIVE IMPAIRMENT: Primary | ICD-10-CM

## 2017-08-16 RX ORDER — CARBIDOPA AND LEVODOPA 50; 200 MG/1; MG/1
1 TABLET, EXTENDED RELEASE ORAL EVERY 6 HOURS
Qty: 360 TABLET | Refills: 3 | COMMUNITY
Start: 2017-08-16 | End: 2018-11-15

## 2017-08-16 RX ORDER — CARBIDOPA AND LEVODOPA 25; 100 MG/1; MG/1
TABLET ORAL
Qty: 270 TABLET | Refills: 3 | COMMUNITY
Start: 2017-08-16 | End: 2018-01-18

## 2017-08-16 NOTE — TELEPHONE ENCOUNTER
I was paged by Marie, nurse manager at [facility] per Dr. Rivas's request.    __  They wanted to know in some cases to have a 15 min leeway to give Sinemet.   Daughter is upset & filing grievance about med administration.  __  She wanted to inform me that pt has been getting her PRN dose daily.  In the last month, there are only 2 times that the PRN dose was given with her scheduled dose.  __  Dinner is served at 4 pm.  She asked if we could change her dose to 3 pm instead of 4 pm to avoid meals.  __  Family is having a hard time with cognitive & mobility decline & the nursing staff is doing everything they can to care for pt.    Plan:  Okay to change the 4 pm Sinemet dose to 3 pm.  Pt will take Sinemet 25/100 mg 1/2 tab at 4 am, 1 tablet at 10 am, & 3 pm, and 1/2 tab 10 pm; Sinemet 50/200 mg 1 tab at 4 am, 10 am, 3 pm, and 10 pm.  Pt to get medication on time all the time; but if unable, okay to give medication within 15 minutes.  Neuropsych evaluation to characterize cognitive difficulties.  Message sent to Dr. Weiss.  The above was discussed with daughter.

## 2017-08-16 NOTE — TELEPHONE ENCOUNTER
----- Message from Chelsea Hare LPN sent at 8/16/2017  1:32 PM CDT -----  Regarding: question  Caller name: Desi staff at [facility] [phone] fax [phone]    Treating provider/specialty: Nani    Nurse:    Best time to return call:    Message left?   please clarify  Description of issue/question:  regarding sinemet 25/100-there are 2 different orders in their MAR-do you want to keep both the prn and scheduled dose?

## 2017-09-27 ENCOUNTER — OFFICE VISIT (OUTPATIENT)
Dept: NEUROPSYCHOLOGY | Facility: CLINIC | Age: 77
End: 2017-09-27

## 2017-09-27 DIAGNOSIS — F09 MENTAL DISORDER DUE TO GENERAL MEDICAL CONDITION: ICD-10-CM

## 2017-09-27 DIAGNOSIS — G20.A1 PARKINSON'S DISEASE (H): Primary | ICD-10-CM

## 2017-09-27 NOTE — MR AVS SNAPSHOT
After Visit Summary   9/27/2017    Eduarda Lazar    MRN: [id number]           Patient Information     Date Of Birth          1940        Visit Information        Provider Department      9/27/2017 12:30 PM Renay Weiss, PhD [facility]        Today's Diagnoses     Parkinson's disease (H)    -  1    Mental disorder due to general medical condition           Follow-ups after your visit        Your next 10 appointments already scheduled     Nov 17, 2017  2:15 PM CST   (Arrive by 2:00 PM)   Return Movement Disorder with Marcelo Cardoza MD   [facility] ([facility])    [address] [address] [phone]              Who to contact     Please call your clinic at [phone] to:    Ask questions about your health    Make or cancel appointments    Discuss your medicines    Learn about your test results    Speak to your doctor   If you have compliments or concerns about an experience at your clinic, or if you wish to file a complaint, please contact [facility] Patient Relations at [phone] or email us at [email or web address]         Additional Information About Your Visit        MyChart Information     Vidyardt gives you secure access to your electronic health record. If you see a primary care provider, you can also send messages to your care team and make appointments. If you have questions, please call your primary care clinic.  If you do not have a primary care provider, please call [phone] and they will assist you.      PassportParking is an electronic gateway that provides easy, online access to your medical records. With PassportParking, you can request a clinic appointment, read your test results, renew a prescription or communicate with your care team.     To access your existing account, please contact your [facility] or call  [phone] for assistance.        Care EveryWhere ID     This is your Care EveryWhere ID. This could be used by other organizations to access your [place] medical records  [id number]         Blood Pressure from Last 3 Encounters:   08/15/17 104/51   02/14/17 117/57   11/15/16 141/69    Weight from Last 3 Encounters:   08/15/17 87.5 kg (192 lb 12.8 oz)   02/14/17 91.3 kg (201 lb 3.2 oz)   11/12/16 98.9 kg (218 lb)              We Performed the Following     [id number] TESTING, PER HR/PSYCHOLOGIST     NEUROPSYCH TESTING BY [facility]        Primary Care Provider Office Phone # Fax #    Michael Chaves [phone]       [facility] [address] [facility] [address]        Equal Access to Services     [facility] MARIA : Hadii aad ku hadasho Soomaali, waaxda luqadaha, qaybta kaalmada adeegyada, waxay eugenein hayaan adexin smalls . So [facility] [phone].    ATENCIÓN: Si habla español, tiene a art disposición servicios gratuitos de asistencia lingüística. Teja al [phone].    We comply with applicable federal civil rights laws and [place] laws. We do not discriminate on the basis of race, color, national origin, age, disability, sex, sexual orientation, or gender identity.            Thank you!     Thank you for choosing [facility] NEUROPSYCHOLOGY  for your care. Our goal is always to provide you with excellent care. Hearing back from our patients is one way we can continue to improve our services. Please take a few minutes to complete the written survey that you may receive in the mail after your visit with us. Thank you!             Your Updated Medication List - Protect others around you: Learn how to safely use, store and throw away your medicines at www.disposemymeds.org.          This list is accurate as of: 9/27/17 11:59 PM.  Always use your most recent med list.                   Brand Name Dispense Instructions for use Diagnosis    acetaminophen 325 MG tablet     TYLENOL    100 tablet    Take 1 tablet (325 mg) by mouth every 4 hours as needed for pain        * carbidopa-levodopa  MG per tablet    SINEMET    180 tablet    Take 0.5 tablets by mouth 2 times daily as needed for tremors    Parkinson disease (H)       * carbidopa-levodopa  MG per tablet    SINEMET    270 tablet    Take 1/2 tab at 4 am, 1 tablet at 10 am, & 3 pm, and 1/2 tab 10 pm.    Parkinson's disease (H)       * carbidopa-levodopa  MG per CR tablet    SINEMET CR    360 tablet    Take 1 tablet by mouth every 6 hours At 4 am, 10 am, 3 pm, and 10 pm    Parkinson disease (H)       Lutein 20 MG Tabs     90 tablet    Take 20 mg by mouth daily        REMERON 15 MG tablet   Generic drug:  mirtazapine     30 tablet    Take 0.5 tablets (7.5 mg) by mouth At Bedtime        rivastigmine 9.5 MG/24HR 24 hr patch    EXELON    30 patch    Apply 4.6mg patch daily for 4 weeks then 9.5mg patch daily for 4 weeks then 13.3 mg patch daily    Parkinson disease (H), Cognitive impairment       * Notice:  This list has 3 medication(s) that are the same as other medications prescribed for you. Read the directions carefully, and ask your doctor or other care provider to review them with you.

## 2017-09-27 NOTE — NURSING NOTE
The patient was seen for neuropsychological evaluation at the request of Dr. Clinton Zepeda for the purpose of diagnostic clarification and treatment planning.  2 hours of face to face testing were provided by this writer.  Please see Dr. Renay Weiss's report for a full interpretation of the findings.

## 2017-10-13 PROBLEM — G20.A1 PARKINSON'S DISEASE DEMENTIA (H): Status: ACTIVE | Noted: 2017-10-13

## 2017-10-13 PROBLEM — F02.80 PARKINSON'S DISEASE DEMENTIA (H): Status: ACTIVE | Noted: 2017-10-13

## 2017-10-13 NOTE — PROGRESS NOTES
NAME: Eduarda Lazar  MR#: [id number]  YOB: 1940  DATE OF EXAM: 9/27/2017    Neuropsychology Laboratory  [facility]  [address] [place], [facility] [address]  [place]  [address] [phone]    NEUROPSYCHOLOGICAL EVALUATION    RELEVANT HISTORY AND REASON FOR REFERRAL    Rayo Lazar is a 77-year-old, left-handed retired  and  with 12 years of formal education. Information was obtained via interview with the patient and her daughter, and review of her medical records, including a prior neuropsychological evaluation. Ms. Lazar has a long-standing history of Parkinson s disease, and is status post bilateral STN DBS implantation. She was seen in the Neurology clinic on 8/15/2017. At that visit, it was noted that she had been living at [facility] since November 2016, as she was falling at home. About three weeks prior to her neurology visit, she had a visit with the dentist and her tooth was pulled, and since then she had an overall decline in motor functioning and cognition. Her tremors and memory had worsened. She indicated that staff was not consistent in giving her medications on time and avoiding food. Her son indicated that when he called her a week earlier, she did not know who he was. She had been tried on rivastigmine for about four months, but her skin was itching and her memory did not seem to improve so the patch was discontinued. Her daughter reported that Ms. Lazar is frustrated and angry that she cannot make changes in her medications like she did before. The rivastigmine patch was restarted in early August. Given concerns regarding cognitive and functional decline, she was referred for neuropsychological evaluation by MIRZA Downing, CNP, for further characterization of any cognitive difficulties and to evaluate mood.    Ms. Lazar first underwent a neuropsychological evaluation under my direction on  10/29/2007 to supplement an outside evaluation in September 2007, as she was being considered for DBS surgery. Results of her October 29, 2007 evaluation indicated subtle executive dysfunction, with performance otherwise falling within normal limits. DBS surgery was initially delayed because of cardiac concerns, but by 2009 it was being considered again. She thus underwent a second neuropsychological evaluation under my direction on 5/21/2009. Results of that evaluation indicated mild executive dysfunction, with performance otherwise falling well within normal limits across cognitive domains. She denied significant depressive symptomatology. Compared to her performance in October 2007, she had experienced a decline in executive functioning, improvements in some aspects of complex attentional processing, and performance that otherwise remained stable across cognitive domains.    CLINICAL INTERVIEW FINDINGS    Upon interview for this evaluation, Ms. Lazar stated that her memory seems to be getting worse. Her daughter indicated that her memory seems to be going downhill since she moved to the nursing home in November. She had fallen and was hospitalized for three days before moving to the nursing home. She had been in an apartment that was not suitable for her care, as she had fallen three times and was in a secured building. A phone was needed to be let into the apartment building, but the system did not work. She was also leaving pans on the stove and forgetting them, and was not bathing because she was scared to get into the shower because of her falls. She participated in rehab at the nursing home and then transferred to assisted living, but needed too many cares so she went back to the nursing home. She forgets what others have said, and sometimes becomes lost in familiar places. She has been misplacing items and repeating questions. She acknowledged difficulty with word finding as well as comprehension. She  has difficulty with attention and concentration. She seems to have had greater difficulty with decision-making, and her daughter stated that she says  I don t know  all the time. Her daughter has been managing her finances for her for almost 2 years. She had been doing fine with paying her bills on time but her handwriting had been affected and she could not work the phone fast enough. Her medications are managed for her.    When asked about her mood, Ms. Lazar stated that she does not like how she has been feeling. Her daughter agreed that she seems much more depressed and that she is always crabby. She has been crying more than normal. She spends much of her time sleeping. She stated that she watches television and is up a lot at night, but that she naps frequently during the day. Her appetite is stronger than she would like but she believes she may have lost some weight. Her energy level varies. Her interest level is low. Her daughter stated that she has not been participating in the activities of the nursing home, although she occasionally sings. She agreed that she would have done more in the past and that she is spending more time by herself. She used to enjoy dancing. Now, she is sitting in her room a lot. She denied suicidal ideation. She has not had visual or auditory hallucinations. She denied alcohol, tobacco, or illicit drug use.    Ms. Lazar has had weakness and numbness on her right side. She continues to have tremor on her right side, although she stated that her bilateral DBS had good benefit. She experiences headaches a few times a month or more. She denied other aches or pains, although her daughter noted that she has had problems with her left knee and ankle. She has not had her hearing checked recently and does not wear hearing aids.    PAST MEDICAL HISTORY: Medical records indicate a history of E. coli urinary tract infection, acute kidney injury, systemic inflammatory response syndrome,  hearing loss, sleep apnea, Parkinson s disease, urinary incontinence, and rosacea.    CURRENT MEDICATIONS:  Include carbidopa-levodopa, acetaminophen, mirtazapine, lutein, and rivastigmine.    FAMILY MEDICAL HISTORY:  Significant for a sister of Parkinson s disease, a brother with Alzheimer s disease in his 70s, a siste with cardiovascular and cerebrovascular disease, diabetes, and depression, a father with cerebrovascular and cardiovascular disease, and a mother with cerebrovascular disease and diabetes.    BEHAVIORAL OBSERVATIONS    During the evaluation, Ms. Lazar was pleasant and cooperative, but seemed to have some difficulty comprehending test instructions. She was alert and oriented to person, city, and year, but not to date, stating that it was August 27, 2017, or to name of [facility]. She could not recall where she was born and although she could name the current , she could not name the president before him. She used a wheelchair. Occasional jerking movements of her shoulder were observed clinically. Mood was euthymic. Speech was slow with normal articulation and volume. Spontaneous conversation was present and appropriate, although content was at times tangential. Although she seemed to have difficulty with hearing at times, hearing appeared to be adequate to accurately perceive test stimuli. Ms. Lazar appeared to put forth consistent effort, and the results are believed to accurately reflect her current level of functioning.    MEASURES ADMINISTERED    The following measures were administered by a trained psychometrist, under the direct supervision of a licensed psychologist.    Subtests of the Wechsler Adult Intelligence Scale-4; Reading subtest of the Wide Range Achievement Test-4; subtests of the Wechsler Memory Scale-Revised; Voss Verbal Learning Test-Revised, Form 4; [place] Naming Test; Controlled Oral Word Association Test; Semantic Fluency; Trail Making Test;  Dobson Judgement of Line Orientation Form V; Clock Drawing; Dementia Rating Scale - 2 (DRS-2) Standard Form; geriatric Depression Scale (GDS).     RESULTS AND INTERPRETATION    Performance on a screening measure of dementia was moderately impaired (DRS-2 Total Score = 115/144). Specifically, she had moderate difficulty on tasks of memory, construction, and initiation/perseveration on this measure.    Confrontation naming was average for her age. Verbal abstract reasoning was low average. Letter fluency was mildly impaired, and generative naming to category was moderately impaired. Performance on these fluency tasks was notable for loss of set on several occasions.    Attention span was moderately impaired for her age. Divided attention was severely impaired on both a timed, visual motor sequencing task, and an untimed, auditory sequencing task.    Basic visual perception, including matching lines and angles, was severely impaired. She was unable to adequately learn the task. Construction of a clock was notable for some perseverative placement of numbers, although she set the hands accurately. Construction of simple geometric designs was impaired.    Immediate recall of verbal narrative material was mildly impaired, with severely impaired recall following a 30 minute delay. On a multiple trial list learning task, immediate recall was mildly impaired, with severely impaired recall following a 25 minute delay. Recognition memory on this task was mildly impaired. Immediate recall of visual material was moderately impaired, with severely impaired recall following a 30 minute delay. Recognition memory on this task was marginally better than free recall.    On the GDS, a self-report questionnaire, Ms. Lazar endorsed a minimal level of depressive symptomatology. Specifically, she acknowledged that she is not basically satisfied with her life and she has dropped many of her activities and interests. She often gets  restless and fidgety and prefers to stay at home rather than going out and doing new things. She frequently worries about the future and does not feel full of energy. She has trouble concentrating and her mind is not as clear as it used to be.    IMPRESSIONS AND RECOMMENDATIONS    Current results indicate moderate dementia, characterized by significant impairments in learning and retrieval of learned information, executive dysfunction including impairments in the ability to establish and maintain cognitive set, and impairments in complex attentional processing, information and psychomotor processing speed, and visual processing. Language abilities reflect a relative strength. She endorsed a minimal level of depressive symptomatology. Compared to her most recent neuropsychological evaluation in May 2009, she has had significant declines across cognitive domains; at that time, performance in most cognitive domains fell in the average to above average range, with the single exception of executive functioning which was impaired.    This pattern of performance is suggestive of global cerebral involvement, although there is an indication of greater frontal and subcortical system involvement. Taken together with her history, the findings are worrisome for Parkinson s disease with dementia. She has had significant decline in functioning over the last year or so, and moved from independent living to a nursing home. These findings are consistent with the progression of her disease. In August it was noted that she had experienced noticeable decline in cognition after a dentist visit to have a tooth extracted. Additional infectious contributions should be ruled out. She is likely more susceptible to episodes of delirium given her underlying dementia.  She is endorsing only minimal depressive symptomatology. Nonrestorative sleep may exacerbate her cognitive difficulties but does not appear to be the sole contributor.    In terms  of daily functioning, Ms. Lazar is likely to require ongoing and substantial assistance with management of her instrumental and basic activities of daily living. She has limited insight into her cognitive difficulties. She may require supervision for her safety. She is likely to benefit from structure and routine. It is notable that verbal abilities reflect a strength for her, and may contribute to the appearance that she is functioning at a higher level cognitively. She does appear to have difficulty with hearing, which is likely to exacerbate her underlying cognitive difficulties. If not already considered, she may benefit from a referral to audiology for further evaluation of hearing and treatment recommendations. These results have not been discussed with Ms. Lazar or her family, although they were given my contact information to schedule an appointment for feedback if they would like.    Renay Weiss, Ph.D., [facility]  Licensed [employer]ologist, [employer] [id number]  Board Certified in Clinical Neuropsychology    Time spent:  Four hours professional time, including interview, record review, data integration, and report writing (CPT 33167); an additional two hours, including testing administered by a psychometrist and interpreted by a neuropsychologist (CPT 68606). ICD-10 diagnosis: G20; F06.8.

## 2017-11-01 NOTE — PROGRESS NOTES
WAIS-IV    Raw              Age Scaled   Similarities  16      7   Letter Num. Seq.    3     1   Digit Span    9     2     WECHSLER MEMORY SCALE - REVISED    Raw Score        MAS         %ile  Information & Orientation  9   Logical Memory  Immed.    9    5   5   Logical Memory  30 min.    0    2    <1   Visual Reprod Imm.  14    3   1   Visual Reprod 30 min.    0    2    <1   30 Minute Recognition    2     QUIÑONES VERBAL LEARNING TEST - REVISED  Form 4                                               Raw                T                                        Trial 1               4   Trial 2               5   Trial 3  4   Total 1-3     13   31   Learning  0   Delay  0      <20   Percent Retained  0      ?20   True Positives  9   Discrimination Index  7   32   False Positives  2     BOSTON NAMING TEST  Score     54      MAS   11   63 %ile  [  54 w/o cues  3 w/phonemic cues]    CONTROLLED ORAL WORD ASSOC TEST  Score     14              MAS 5   5 %ile    SEMANTIC FLUENCY  Score     10              MAS 2   <1 %ile         CLOCK DRAWING  Command   2/3    Copy     2 /3       TRAIL MAKING TEST         Seconds         Errors           MAS                %ile  A   D/C 240   --   -  . -   B    D/C   --   -  . -     NAVARRO JUDGMENT OF LINE ORIENTATION  Raw      D/C  MAS   -     DEMENTIA RATING SCALE - 2    Raw MAS Raw     MAS   Attention  32    7   Concept  38   12    Init/Psv  28    4  Memory  16     2   Construct    1    2  Total    115/144    3     GERIATRIC DEPRESSION SCALE:  8    MAS = [place] Older Adult Normative Study Age Adj. Scaled Score
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
## 2018-01-16 ENCOUNTER — OFFICE VISIT (OUTPATIENT)
Dept: NEUROLOGY | Facility: CLINIC | Age: 78
End: 2018-01-16
Payer: MEDICARE

## 2018-01-16 ENCOUNTER — RECORDS - HEALTHEAST (OUTPATIENT)
Dept: LAB | Facility: CLINIC | Age: 78
End: 2018-01-16

## 2018-01-16 VITALS
SYSTOLIC BLOOD PRESSURE: 153 MMHG | RESPIRATION RATE: 28 BRPM | HEART RATE: 73 BPM | HEIGHT: 67 IN | DIASTOLIC BLOOD PRESSURE: 74 MMHG | TEMPERATURE: 98.1 F | OXYGEN SATURATION: 97 % | BODY MASS INDEX: 30.37 KG/M2 | WEIGHT: 193.5 LBS

## 2018-01-16 DIAGNOSIS — G20.A1 PARKINSON DISEASE (H): ICD-10-CM

## 2018-01-16 DIAGNOSIS — Z98.890 S/P BRAIN SURGERY: ICD-10-CM

## 2018-01-16 DIAGNOSIS — G20.A1 PARKINSON'S DISEASE (H): Primary | ICD-10-CM

## 2018-01-16 DIAGNOSIS — R41.89 COGNITIVE IMPAIRMENT: ICD-10-CM

## 2018-01-16 LAB
ALBUMIN UR-MCNC: ABNORMAL MG/DL
AMORPH CRY #/AREA URNS HPF: ABNORMAL /[HPF]
APPEARANCE UR: CLEAR
BACTERIA #/AREA URNS HPF: ABNORMAL HPF
BILIRUB UR QL STRIP: NEGATIVE
COLOR UR AUTO: YELLOW
GLUCOSE UR STRIP-MCNC: NEGATIVE MG/DL
HGB UR QL STRIP: NEGATIVE
KETONES UR STRIP-MCNC: NEGATIVE MG/DL
LEUKOCYTE ESTERASE UR QL STRIP: ABNORMAL
MUCOUS THREADS #/AREA URNS LPF: ABNORMAL LPF
NITRATE UR QL: POSITIVE
PH UR STRIP: 7.5 [PH] (ref 4.5–8)
RBC #/AREA URNS AUTO: ABNORMAL HPF
SP GR UR STRIP: 1.01 (ref 1–1.03)
SQUAMOUS #/AREA URNS AUTO: ABNORMAL LPF
TRI-PHOS CRY #/AREA URNS HPF: PRESENT /[HPF]
UROBILINOGEN UR STRIP-ACNC: ABNORMAL
WBC #/AREA URNS AUTO: ABNORMAL HPF

## 2018-01-16 ASSESSMENT — PAIN SCALES - GENERAL: PAINLEVEL: NO PAIN (0)

## 2018-01-16 NOTE — NURSING NOTE
Chief Complaint   Patient presents with     RECHECK     [facility]- MOVEMENT DISORDER, 3 MONTHS F/U     Jayme Bedoya, CMA

## 2018-01-16 NOTE — PATIENT INSTRUCTIONS
Diagnosis/Summary/Recommendations:    PATIENT: Eduarda Lazar  77 year old female     : 1940    ANDREE: 2018    Parkinson  She recently underwent replacement of the generator/battery over the right chest wall on 2015.   NEEDS LEFT BATTERY REPLACEMENT    Moderate dementia    Bilateral dbs    DBS Interrogation & Analysis:     Implanted generator:  Bilateral chest Activa-SC.  Lead placement:  Bilateral Subthalamic Nucleus (STN).     Left Brain     Therapy On:  100%  Battery Service Life:  volts.     C +, 1 -  Volts: 2.6 volts  PW: 120 msec  Rate: 160 Hz    Right Brain (this was labeled at Left STN)  2 +, 3 -  Volts: 2.8 volts  PW: 90 msec  Rate: 185 Hz         Medications     4AM 8AM 10AM 3PM 8PM 10PM    ACETAMINOPHEN TYLENOL PRN         CARBIDOPA/LEVODOPA CR 50/200 1  1 1  1    CARBIDOPA/LEVODOPA 25/100 1/2  1 1  1/2    CARBIDOPA/LEVODOPA 25/100   1/2 TAB 2/DAY AS NEEDED           LUTEIN 20MG  1        MIRTAZAPINE REMERON 15MG     1/2     RIVASTIGMINE EXELON 4.6 MG PATCH  1 PER DAY            Over 50% of this visit was spent in patient care and care coordination.     History obtained from patient    Total visit time was 25 minutes    IMPRESSION/PLAN  WORSENED cognitive function - this has been going on for several months and she has anxiety/sun downing and gets lost more easily. This has been present daily for the past 6-7 months. She has problems operating the phone and using the remote on her chair. She has problems completing a sentence. She has problems getting a sentence out. She has been taking the rivastigmine patch off and on and is only on the 4.6mg. She has not been on a higher dose.     She does have a UTI today that will be treated.  She has several medication allergies including sulfa, clindamycin, vancomycin.     Referral to Dr. Wheat for left IPG replacement - referral made. This is as long as she is medically stable and able and interested in getting it replaced. Her course  has definitely been downhill and therefore the battery replacement will need to be made in context of her overall clinical condition.     Her medication regimen has been challenging to some degree to get her sinemet on time and she rarely asks for the prn sinemet and therefore may need to d/c this prn and make it scheduled or just remove it altogether.     She also has anxiety and is taking remeron/mirtazapine 15mg tabs 1/2 tab at night. She has not been on a higher dose.     She will be having a patient care conference at [facility] at the facility with NICK Mejia and dietitian. The address is [address] [place] [address]  [id number] [place] [address]  Address: [address] [place] [address]  Phone: [phone]    Danielle nurse manager  [phone]     Her pcp is Reyna Merrill  [phone]    Parkinson - she gets up between 730-9am and eats breakfast and then gets her medications  She will need clarification of her parkinson medications so that she gets it before her breakfast and may consider removing the prn doses of sinemet and make her sinemet dosages scheduled and to increase the amount she gets.     Will have Sarah Roldan our RN to discuss her sinemet medication regimen with the facility.    Thus the major issues are   1. Cognition and the rivastigmine patch  2. Anxiety and remeron dose of 1/2 of 15mg at night.   3. Movement - sinemet dose and timing and dbs battery  4. UTI that will be treated    Return to see Margarita in a few months     Marcelo Cardoza MD
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
## 2018-01-16 NOTE — MR AVS SNAPSHOT
After Visit Summary   2018    Eduarda Lazar    MRN: [id number]           Patient Information     Date Of Birth          1940        Visit Information        Provider Department      2018 3:10 PM Marcelo Cardoza MD [facility] Neurology        Today's Diagnoses     Parkinson's disease (H)    -  1    S/P brain surgery        Cognitive impairment        Parkinson disease (H)          Care Instructions    Diagnosis/Summary/Recommendations:    PATIENT: Eduarda Lazar  77 year old female     : 1940    ANDREE: 2018    Parkinson  She recently underwent replacement of the generator/battery over the right chest wall on 2015.   NEEDS LEFT BATTERY REPLACEMENT    Moderate dementia    Bilateral dbs    DBS Interrogation & Analysis:     Implanted generator:  Bilateral chest Activa-SC.  Lead placement:  Bilateral Subthalamic Nucleus (STN).     Left Brain     Therapy On:  100%  Battery Service Life:  volts.     C +, 1 -  Volts: 2.6 volts  PW: 120 msec  Rate: 160 Hz    Right Brain (this was labeled at Left STN)  2 +, 3 -  Volts: 2.8 volts  PW: 90 msec  Rate: 185 Hz         Medications     4AM 8AM 10AM 3PM 8PM 10PM    ACETAMINOPHEN TYLENOL PRN         CARBIDOPA/LEVODOPA CR 50/200 1  1 1  1    CARBIDOPA/LEVODOPA 25/100 1/2  1 1  1/2    CARBIDOPA/LEVODOPA 25/100   1/2 TAB 2/DAY AS NEEDED           LUTEIN 20MG  1        MIRTAZAPINE REMERON 15MG     1/2     RIVASTIGMINE EXELON 4.6 MG PATCH  1 PER DAY            Over 50% of this visit was spent in patient care and care coordination.     History obtained from patient    Total visit time was 25 minutes    IMPRESSION/PLAN  WORSENED cognitive function - this has been going on for several months and she has anxiety/sun downing and gets lost more easily. This has been present daily for the past 6-7 months. She has problems operating the phone and using the remote on her chair. She has problems completing a sentence. She has problems  getting a sentence out. She has been taking the rivastigmine patch off and on and is only on the 4.6mg. She has not been on a higher dose.     She does have a UTI today that will be treated.  She has several medication allergies including sulfa, clindamycin, vancomycin.     Referral to Dr. Wheat for left IPG replacement - referral made. This is as long as she is medically stable and able and interested in getting it replaced. Her course has definitely been downhill and therefore the battery replacement will need to be made in context of her overall clinical condition.     Her medication regimen has been challenging to some degree to get her sinemet on time and she rarely asks for the prn sinemet and therefore may need to d/c this prn and make it scheduled or just remove it altogether.     She also has anxiety and is taking remeron/mirtazapine 15mg tabs 1/2 tab at night. She has not been on a higher dose.     She will be having a patient care conference at [facility] at the facility with NICK Mejia and dietitian. The address is [address] [place] [address]  Address: [address] [place] [address]  Phone: [phone]    Danielle nurse manager  [phone]     Her pcp is Reyna Merrill  [phone]    Parkinson - she gets up between 730-9am and eats breakfast and then gets her medications  She will need clarification of her parkinson medications so that she gets it before her breakfast and may consider removing the prn doses of sinemet and make her sinemet dosages scheduled and to increase the amount she gets.     Will have Sarah Roldan our RN to discuss her sinemet medication regimen with the facility.    Thus the major issues are   1. Cognition and the rivastigmine patch  2. Anxiety and remeron dose of 1/2 of 15mg at night.   3. Movement - sinemet dose and timing and dbs battery  4. UTI that will be treated    Return to see Margarita in a few months      Marcelo Cardoza MD             Follow-ups after your visit        Additional Services     NEUROSURGERY REFERRAL       Your provider has referred you to: NEUROSURGICAL CONSULTATION WITH [facility] [facility] FOR BATTERY    Please be aware that coverage of these services is subject to the terms and limitations of your health insurance plan.  Call member services at your health plan with any benefit or coverage questions.      Please bring the following with you to your appointment:    (1) Any X-Rays, CTs or MRIs which have been performed.  Contact the facility where they were done to arrange for  prior to your scheduled appointment.   (2) List of current medications  (3) This referral request   (4) Any documents/labs given to you for this referral                  Follow-up notes from your care team     Return in about 3 months (around 4/16/2018).      Your next 10 appointments already scheduled     May 15, 2018 12:25 PM CDT   (Arrive by 12:10 PM)   Return Movement Disorder with MIRZA Patel [facility]rology ([facility])    [address] [place] [id number] [phone]              Who to contact     Please call your clinic at [phone] to:    Ask questions about your health    Make or cancel appointments    Discuss your medicines    Learn about your test results    Speak to your doctor   If you have compliments or concerns about an experience at your clinic, or if you wish to file a complaint, please contact [facility] Patient Relations at [phone] or email us at [email or web address]         Additional Information About Your Visit        [employer] Information     [employer] gives you secure access to your electronic health record. If you see a primary care provider, you can also send messages to your care team and make appointments. If you have questions, please call your primary care clinic.  If you do not have  "a primary care provider, please call [phone] and they will assist you.      [employer] is an electronic gateway that provides easy, online access to your medical records. With [employer], you can request a clinic appointment, read your test results, renew a prescription or communicate with your care team.     To access your existing account, please contact your [facility] or call [phone] for assistance.        Care EveryWhere ID     This is your Care EveryWhere ID. This could be used by other organizations to access your [facility] medical records  [id number]        Your Vitals Were     Pulse Temperature Respirations Height Pulse Oximetry Breastfeeding?    73 98.1  F (36.7  C) 28 1.702 m (5' 7\") 97% No    BMI (Body Mass Index)                   30.31 kg/m2            Blood Pressure from Last 3 Encounters:   01/16/18 153/74   08/15/17 104/51   02/14/17 117/57    Weight from Last 3 Encounters:   01/16/18 87.8 kg (193 lb 8 oz)   08/15/17 87.5 kg (192 lb 12.8 oz)   02/14/17 91.3 kg (201 lb 3.2 oz)              We Performed the Following     NEUROSURGERY REFERRAL          Today's Medication Changes          These changes are accurate as of: 1/16/18  4:21 PM.  If you have any questions, ask your nurse or doctor.               These medicines have changed or have updated prescriptions.        Dose/Directions    rivastigmine 9.5 MG/24HR 24 hr patch   Commonly known as:  EXELON   This may have changed:    - how much to take  - how to take this  - when to take this  - additional instructions   Used for:  Parkinson disease (H), Cognitive impairment        Apply 4.6mg patch daily for 4 weeks then 9.5mg patch daily for 4 weeks then 13.3 mg patch daily   Quantity:  30 patch   Refills:  11                Primary Care Provider Office Phone # Fax #    Michael Chaves [phone]       [facility] [address]  [facility] [address]        Equal Access to " Services     [facility]: Hadii aad ku hadtamekaariel Polanco, waaxda luqadaha, qaybta kaalmayoshi ennsi, mishel smalls . [facility] [phone].    ATENCIÓN: Si milind lira, tiene a art disposición servicios gratuitos de asistencia lingüística. Llame al [phone].    We comply with applicable federal civil rights laws and [place] laws. We do not discriminate on the basis of race, color, national origin, age, disability, sex, sexual orientation, or gender identity.            Thank you!     Thank you for choosing [facility]  for your care. Our goal is always to provide you with excellent care. Hearing back from our patients is one way we can continue to improve our services. Please take a few minutes to complete the written survey that you may receive in the mail after your visit with us. Thank you!             Your Updated Medication List - Protect others around you: Learn how to safely use, store and throw away your medicines at www.disposemymeds.org.          This list is accurate as of: 1/16/18  4:21 PM.  Always use your most recent med list.                   Brand Name Dispense Instructions for use Diagnosis    acetaminophen 325 MG tablet    TYLENOL    100 tablet    Take 1 tablet (325 mg) by mouth every 4 hours as needed for pain        * carbidopa-levodopa  MG per tablet    SINEMET    180 tablet    Take 0.5 tablets by mouth 2 times daily as needed for tremors    Parkinson disease (H)       * carbidopa-levodopa  MG per tablet    SINEMET    270 tablet    Take 1/2 tab at 4 am, 1 tablet at 10 am, & 3 pm, and 1/2 tab 10 pm.    Parkinson's disease (H)       * carbidopa-levodopa  MG per CR tablet    SINEMET CR    360 tablet    Take 1 tablet by mouth every 6 hours At 4 am, 10 am, 3 pm, and 10 pm    Parkinson disease (H)       Lutein 20 MG Tabs     90 tablet    Take 20 mg by mouth daily        REMERON 15 MG tablet   Generic drug:  mirtazapine     30 tablet    Take  0.5 tablets (7.5 mg) by mouth At Bedtime        rivastigmine 9.5 MG/24HR 24 hr patch    EXELON    30 patch    Apply 4.6mg patch daily for 4 weeks then 9.5mg patch daily for 4 weeks then 13.3 mg patch daily    Parkinson disease (H), Cognitive impairment       * Notice:  This list has 3 medication(s) that are the same as other medications prescribed for you. Read the directions carefully, and ask your doctor or other care provider to review them with you.
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
## 2018-01-18 ENCOUNTER — TELEPHONE (OUTPATIENT)
Dept: NEUROLOGY | Facility: CLINIC | Age: 78
End: 2018-01-18

## 2018-01-18 DIAGNOSIS — G20.A1 PARKINSON'S DISEASE (H): ICD-10-CM

## 2018-01-18 DIAGNOSIS — G20.A1 PARKINSON DISEASE (H): ICD-10-CM

## 2018-01-18 DIAGNOSIS — R41.89 COGNITIVE IMPAIRMENT: ICD-10-CM

## 2018-01-18 LAB — BACTERIA SPEC CULT: ABNORMAL

## 2018-01-18 RX ORDER — CARBIDOPA AND LEVODOPA 25; 100 MG/1; MG/1
TABLET ORAL
Qty: 270 TABLET | Refills: 3 | COMMUNITY
Start: 2018-01-18 | End: 2018-11-15

## 2018-01-18 RX ORDER — CARBIDOPA AND LEVODOPA 25; 100 MG/1; MG/1
TABLET ORAL
Qty: 180 TABLET | Refills: 3 | COMMUNITY
Start: 2018-01-18 | End: 2018-01-18

## 2018-01-18 RX ORDER — RIVASTIGMINE 4.6 MG/24H
1 PATCH, EXTENDED RELEASE TRANSDERMAL DAILY
Qty: 30 PATCH | Refills: 3 | COMMUNITY
Start: 2018-01-18 | End: 2018-03-02 | Stop reason: SINTOL

## 2018-01-18 NOTE — TELEPHONE ENCOUNTER
Called nurse manager Danielle [phone] and left voice mail that we will make the first medication change once patient is done with her Keflex for the urinary tract infection.    I stated that we will want to focus on the following, in the order listed:    1. Cognition - increasing the Exelon patch to 9.5 mg    2. Anxiety - increase the mirtazapine to 15 mg at bedtime     3. Movement - Parkinson's disease med changes    Updated med list using care facility info.

## 2018-01-19 ENCOUNTER — TELEPHONE (OUTPATIENT)
Dept: NEUROSURGERY | Facility: CLINIC | Age: 78
End: 2018-01-19

## 2018-01-19 NOTE — TELEPHONE ENCOUNTER
Left  for pt's daughter Aixa to discuss a consult appt with Dr. Wheat for DBS battery replacement. I was not sure if the family wanted to move forward with scheduling an appointment, so requested that Aixa call me at her earliest convenience. Left my direct number.

## 2018-01-23 ENCOUNTER — TELEPHONE (OUTPATIENT)
Dept: NEUROSURGERY | Facility: CLINIC | Age: 78
End: 2018-01-23

## 2018-01-23 NOTE — TELEPHONE ENCOUNTER
Called pt's daughter to discuss DBS battery replacement, as Dr. Cardoza was not sure if pt/family wanted to move forward. Aixa said that they do wish to have the battery replaced. Therefore we will schedule a consult with Dr. Wheat on 2/5 at 11:00 a.m. I will f/u with our  to get the appt set. No further questions at this time.

## 2018-01-30 DIAGNOSIS — R41.89 COGNITIVE IMPAIRMENT: ICD-10-CM

## 2018-01-30 DIAGNOSIS — G20.A1 PARKINSON'S DISEASE (H): Primary | ICD-10-CM

## 2018-01-30 RX ORDER — RIVASTIGMINE 9.5 MG/24H
1 PATCH, EXTENDED RELEASE TRANSDERMAL DAILY
Qty: 30 PATCH | Refills: 11 | Status: SHIPPED | OUTPATIENT
Start: 2018-01-30 | End: 2018-02-07

## 2018-01-30 NOTE — TELEPHONE ENCOUNTER
Called IRENE Rothman and told her Dr. Cardoza is increasing the dose on the Exelon patch. Lorna will call daughter and update.  Fax of prescription sent to Lorna at [phone] and to Faye Jerry [facility] [phone].    Both faxes successful:

## 2018-01-30 NOTE — TELEPHONE ENCOUNTER
Received voice mail from IRENE Rothman stating that the patient seems to be more at baseline and has finished her course of Keflex. Her tremor and confusion has improved. She is wondering what the next steps are for the patient.

## 2018-02-05 ENCOUNTER — OFFICE VISIT (OUTPATIENT)
Dept: NEUROSURGERY | Facility: CLINIC | Age: 78
End: 2018-02-05
Attending: PSYCHIATRY & NEUROLOGY
Payer: MEDICARE

## 2018-02-05 VITALS — HEIGHT: 67 IN | HEART RATE: 96 BPM | SYSTOLIC BLOOD PRESSURE: 134 MMHG | DIASTOLIC BLOOD PRESSURE: 75 MMHG

## 2018-02-05 DIAGNOSIS — Z45.42 END OF BATTERY LIFE OF DEEP BRAIN STIMULATOR: ICD-10-CM

## 2018-02-05 DIAGNOSIS — Z96.89 S/P DEEP BRAIN STIMULATOR PLACEMENT: ICD-10-CM

## 2018-02-05 DIAGNOSIS — G20.A1 PARKINSON'S DISEASE (H): Primary | ICD-10-CM

## 2018-02-05 ASSESSMENT — PAIN SCALES - GENERAL: PAINLEVEL: NO PAIN (0)

## 2018-02-05 NOTE — NURSING NOTE
Chief Complaint   Patient presents with     Consult     [facility]- Tony Funk MA
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
## 2018-02-05 NOTE — NURSING NOTE
Pre-op Teaching            Pre-op folder with specific written instructions    Left DBS battery replacement  2/13/18 at 10:30, 7:30 arrival as surgery may go earlier, per surgery scheduler  Dr. Wheat    Discussed pre-op routine and requirements with pt and son to include:  surgical procedure, post-op recovery and expectations, need for H&P/PAC, NPO prior to OR, pre-op antibacterial showers, pain control and importance of follow-up visits.  Surgery scheduling will coordinate OR time/date and update patient as appropriate.  3C will call with more instructions 24-48 hour pre-op.   Ample time was provided for patient questions and in-depth discussion of topics of heightened interest.  Antibacterial soap solution will be given to patient at PAC; discussed specific instructions for use.  Patient/family verbalized understanding of instructions.  Approximately 20 minutes spent with patient and family discussing and reviewing.

## 2018-02-05 NOTE — MR AVS SNAPSHOT
After Visit Summary   2/5/2018    Eduarda Lazar    MRN: [id number]           Patient Information     Date Of Birth          1940        Visit Information        Provider Department      2/5/2018 11:00 AM Francisco Wheat MD [facility] Neurosurgery        Today's Diagnoses     Parkinson's disease (H)    -  1    End of battery life of deep brain stimulator        S/P deep brain stimulator placement           Follow-ups after your visit        Additional Services     PAC Visit Referral (For [facility] Only)       Does this visit require an Anesthesia consult?  Yes - Evaluate for medical necessity related to one of the following conditions:  Other: Parkinson's disease; anesthesia optimization    H&P done by:  Other (Specify): PAC      Please be aware that coverage of these services is subject to the terms and limitations of your health insurance plan.  Call member services at your health plan with any benefit or coverage questions.      Please bring the following to your appointment:  >>   Any x-rays, CTs or MRIs which have been performed.  Contact the facility where they were done to arrange for  prior to your scheduled appointment.  Any new CT, MRI or other procedures ordered by your specialist must be performed at a [facility] facility or coordinated by your clinic's referral office.    >>   List of current medications  >>   This referral request   >>   Any documents/labs given to you for this referral                  Your next 10 appointments already scheduled     Feb 07, 2018  4:30 PM CST   (Arrive by 4:15 PM)   PAC EVALUATION with Poli Pac Briana 3   [facility] Preoperative Assessment Center ([facility])    [address] [place] [address]  4th [facility] [id number] [phone]            Feb 07, 2018  4:50 PM CST   (Arrive by 4:35 PM)   PAC Anesthesia Consult with Poli Pac Anesthesiologist   [facility] Preoperative Assessment Center ([facility])    [address] [place] [address]  4th [facility] [address] [phone]            Feb 07, 2018  5:30 PM CST   (Arrive by 5:15 PM)   PAC RN ASSESSMENT with Poli Pac Rn   [facility] Preoperative Assessment Center ([facility])    [address] [place] [address]  4th [facility] [id number] [phone]            May 15, 2018 12:25 PM CDT   (Arrive by 12:10 PM)   Return Movement Disorder with MIRZA Patel CNP   [facility] Neurology ([facility])    [address] [place] [address]  3rd [facility] [id number] [phone]              Who to contact     Please call your clinic at [phone] to:    Ask questions about your health    Make or cancel appointments    Discuss your medicines    Learn about your test results    Speak to your doctor   If you have compliments or concerns about an experience at your clinic, or if you wish to file a complaint, please contact [facility] Patient Relations at [phone] or email us at [email or web address]         Additional Information About Your Visit        [employer] Information     [employer] gives you secure access to your electronic health record. If you see a primary care provider, you can also send messages to your care team and make appointments. If you have questions, please call your primary care clinic.  If you do not have a primary care provider, please call [phone] and they will assist you.      [employer] is an electronic gateway that provides easy, online access to your medical records. With [employer], you can request a clinic appointment, read your test results, renew a prescription or communicate with your care team.     To access your existing account, please contact your [facility] or call [phone] for assistance.        Care EveryWhere ID     This is your Care EveryWhere ID. This could be used by other organizations to access  "your [facility] medical records  [id number]        Your Vitals Were     Pulse Height                96 1.702 m (5' 7\")           Blood Pressure from Last 3 Encounters:   02/05/18 134/75   01/16/18 153/74   08/15/17 104/51    Weight from Last 3 Encounters:   01/16/18 87.8 kg (193 lb 8 oz)   08/15/17 87.5 kg (192 lb 12.8 oz)   02/14/17 91.3 kg (201 lb 3.2 oz)              We Performed the Following     PAC Visit Referral (For [facility] Only)     Monique-Operative Worksheet          Today's Medication Changes          These changes are accurate as of 2/5/18 12:20 PM.  If you have any questions, ask your nurse or doctor.               These medicines have changed or have updated prescriptions.        Dose/Directions    * rivastigmine 9.5 MG/24HR 24 hr patch   Commonly known as:  EXELON   This may have changed:    - how much to take  - how to take this  - when to take this  - additional instructions   Used for:  Parkinson disease (H), Cognitive impairment        Apply 4.6mg patch daily for 4 weeks then 9.5mg patch daily for 4 weeks then 13.3 mg patch daily   Quantity:  30 patch   Refills:  11       * rivastigmine 4.6 MG/24HR 24 hr patch   Commonly known as:  EXELON   This may have changed:  Another medication with the same name was changed. Make sure you understand how and when to take each.   Used for:  Parkinson disease (H), Cognitive impairment        Dose:  1 patch   Place 1 patch onto the skin daily   Quantity:  30 patch   Refills:  3       * rivastigmine 9.5 MG/24HR 24 hr patch   Commonly known as:  EXELON   This may have changed:  Another medication with the same name was changed. Make sure you understand how and when to take each.   Used for:  Parkinson's disease (H), Cognitive impairment        Dose:  1 patch   Place 1 patch onto the skin daily   Quantity:  30 patch   Refills:  11       * Notice:  This list has 3 medication(s) that are the same as other medications prescribed for you. Read the directions carefully, and " ask your doctor or other care provider to review them with you.             Primary Care Provider Office Phone # Fax #    Reyna CARRANZA [facility] [phone]       [facility] [address] [facility] [address]        Equal Access to Services     MANUEL HADLEY : Hadii aad ku hadtamekao Soomaali, waaxda luqadaha, qaybta kaalmada adeegyada, waxay eugenein haymazinn adeeg ermias serina fritz. So [facility] [phone].    ATENCIÓN: Si lennyla espadithya, tiene a art disposición servicios gratuitos de asistencia lingüística. Llame al [phone].    We comply with applicable federal civil rights laws and [place] laws. We do not discriminate on the basis of race, color, national origin, age, disability, sex, sexual orientation, or gender identity.            Thank you!     Thank you for choosing [facility] NEUROSURGERY  for your care. Our goal is always to provide you with excellent care. Hearing back from our patients is one way we can continue to improve our services. Please take a few minutes to complete the written survey that you may receive in the mail after your visit with us. Thank you!             Your Updated Medication List - Protect others around you: Learn how to safely use, store and throw away your medicines at www.disposemymeds.org.          This list is accurate as of 2/5/18 12:20 PM.  Always use your most recent med list.                   Brand Name Dispense Instructions for use Diagnosis    acetaminophen 325 MG tablet    TYLENOL    100 tablet    Take 1 tablet (325 mg) by mouth every 4 hours as needed for pain        * carbidopa-levodopa  MG per CR tablet    SINEMET CR    360 tablet    Take 1 tablet by mouth every 6 hours At 4 am, 10 am, 3 pm, and 10 pm    Parkinson disease (H)       * carbidopa-levodopa  MG per tablet    SINEMET    270 tablet    Take 1/2 tab at 4 am, 1 tablet at 10 am, & 3 pm, and 1/2 tab 10 pm.    Parkinson's disease (H)       Lutein 20 MG Tabs     90 tablet    Take  20 mg by mouth daily        REMERON 15 MG tablet   Generic drug:  mirtazapine     30 tablet    Take 0.5 tablets (7.5 mg) by mouth At Bedtime        * rivastigmine 9.5 MG/24HR 24 hr patch    EXELON    30 patch    Apply 4.6mg patch daily for 4 weeks then 9.5mg patch daily for 4 weeks then 13.3 mg patch daily    Parkinson disease (H), Cognitive impairment       * rivastigmine 4.6 MG/24HR 24 hr patch    EXELON    30 patch    Place 1 patch onto the skin daily    Parkinson disease (H), Cognitive impairment       * rivastigmine 9.5 MG/24HR 24 hr patch    EXELON    30 patch    Place 1 patch onto the skin daily    Parkinson's disease (H), Cognitive impairment       * Notice:  This list has 5 medication(s) that are the same as other medications prescribed for you. Read the directions carefully, and ask your doctor or other care provider to review them with you.

## 2018-02-06 ENCOUNTER — ANESTHESIA EVENT (OUTPATIENT)
Dept: SURGERY | Facility: CLINIC | Age: 78
End: 2018-02-06

## 2018-02-07 ENCOUNTER — OFFICE VISIT (OUTPATIENT)
Dept: SURGERY | Facility: CLINIC | Age: 78
End: 2018-02-07
Payer: MEDICARE

## 2018-02-07 ENCOUNTER — ALLIED HEALTH/NURSE VISIT (OUTPATIENT)
Dept: SURGERY | Facility: CLINIC | Age: 78
End: 2018-02-07
Payer: MEDICARE

## 2018-02-07 VITALS
BODY MASS INDEX: 29.51 KG/M2 | DIASTOLIC BLOOD PRESSURE: 78 MMHG | RESPIRATION RATE: 16 BRPM | HEIGHT: 67 IN | WEIGHT: 188 LBS | HEART RATE: 70 BPM | OXYGEN SATURATION: 97 % | TEMPERATURE: 97.5 F | SYSTOLIC BLOOD PRESSURE: 152 MMHG

## 2018-02-07 DIAGNOSIS — Z01.818 PREOP EXAMINATION: ICD-10-CM

## 2018-02-07 DIAGNOSIS — Z01.818 PREOP EXAMINATION: Primary | ICD-10-CM

## 2018-02-07 LAB
ANION GAP SERPL CALCULATED.3IONS-SCNC: 6 MMOL/L (ref 3–14)
BUN SERPL-MCNC: 18 MG/DL (ref 7–30)
CALCIUM SERPL-MCNC: 8.7 MG/DL (ref 8.5–10.1)
CHLORIDE SERPL-SCNC: 105 MMOL/L (ref 94–109)
CO2 SERPL-SCNC: 29 MMOL/L (ref 20–32)
CREAT SERPL-MCNC: 0.78 MG/DL (ref 0.52–1.04)
GFR SERPL CREATININE-BSD FRML MDRD: 71 ML/MIN/1.7M2
GLUCOSE SERPL-MCNC: 95 MG/DL (ref 70–99)
HGB BLD-MCNC: 13.7 G/DL (ref 11.7–15.7)
POTASSIUM SERPL-SCNC: 3.9 MMOL/L (ref 3.4–5.3)
SODIUM SERPL-SCNC: 140 MMOL/L (ref 133–144)

## 2018-02-07 ASSESSMENT — ENCOUNTER SYMPTOMS
ORTHOPNEA: 0
DYSRHYTHMIAS: 1

## 2018-02-07 NOTE — PATIENT INSTRUCTIONS
Preparing for Your Surgery      Name:  Eduarda Lazar   MRN:  [id number]   :  1940   Today's Date:  2018     Arriving for surgery:  Replace Deep Brain Stimulator Generator   Surgery date:  2018  Arrival time:  9:00 am  Please come to:       [facility] [address]  [address] [place]  [address]    -   parking is available in front of the hospital from 5:15 am to 8:00 pm    -  Stop at the Information Desk in the lobby    -   Inform the information person that you are here for surgery. An escort to 3c will be provided. If you would not like an escort, please proceed to 3C on the 3rd floor. [phone]     What can I eat or drink?  -  You may have solid food or milk products until 8 hours prior to your surgery.  Nothing after 3 am   -  You may have water, apple juice or 7up/Sprite until 2 hours prior to your surgery.  Until 9 am arrival time     Which medicines can I take?  -  Do NOT take these medications in the morning, the day of surgery:         Hold aspirin for 1 week before procedure       -  Please take these medications the day of surgery:     carbidopa-levodopa (Sinemet)     Mirtazapine (Remeron )      Continue Rivastigmine  (Exelon ) patch       How do I prepare myself?  -  Take two showers: one the night before surgery; and one the morning of surgery.         Use Scrubcare or Hibiclens to wash from neck down.  You may use your own shampoo and conditioner. No other hair products.   -  Do NOT use lotion, powder, deodorant, or antiperspirant the day of your surgery.  -  Do NOT wear any makeup, fingernail polish or jewelry.  -Do not bring your own medications to the hospital, except for inhalers and eye drops.  -  Bring your ID and insurance card.    Questions or Concerns:  If you have questions or concerns, please call the  Preoperative Assessment Center, Monday-Friday 7AM-7PM:  [phone]            
negative...

## 2018-02-07 NOTE — ANESTHESIA PREPROCEDURE EVALUATION
Anesthesia Evaluation     . Pt has had prior anesthetic. Type: MAC    History of anesthetic complications    Pt's daughter notes that the pt had difficulty waking up after a dental procedure approximately 1 year ago.  Remained confused for some time postoperatively.        ROS/MED HX    ENT/Pulmonary:     (+)sleep apnea, asthma Last exacerbation: Does not have an inhaler at this time, and has not needed treatment for asthma.  ,doesn't use CPAP , . .   (-) recent URI   Neurologic:     (+)dementia, Parkinson's disease features: Tremors, difficulty walking, dementia. ,     Cardiovascular: Comment: History of AV node reentrant tachycardia, S/P ablation.      (+) ----. : . . . :. dysrhythmias Other, .      (-) CAD, taking anticoagulants/antiplatelets, QUEZADA and orthopnea/PND   METS/Exercise Tolerance:  3 - Able to walk 1-2 blocks without stopping   Hematologic:  - neg hematologic  ROS       Musculoskeletal:  - neg musculoskeletal ROS       GI/Hepatic:  - neg GI/hepatic ROS       Renal/Genitourinary:  - ROS Renal section negative       Endo:     (+) Obesity, .      Psychiatric:     (+) psychiatric history anxiety and depression      Infectious Disease:  - neg infectious disease ROS       Malignancy:      - no malignancy   Other:    - neg other ROS                 Physical Exam  Normal systems: dental    Airway   Mallampati: II  TM distance: >3 FB  Neck ROM: limited    Dental     Cardiovascular   Rhythm and rate: regular and normal  (+) murmur   (-) no peripheral edema    Pulmonary    breath sounds clear to auscultation    Other findings: 2/7/18: Hgb: 13.7  2/7/18: Na: 140; K: 3.9; Cl: 105; Glu: 95; BUN: 18; Cr: 0.78; Ca: 8.7           PAC Discussion and Assessment    ASA Classification: 3  Case is suitable for: [place]  Anesthetic techniques and relevant risks discussed: MAC with GA as backup  Invasive monitoring and risk discussed: No  Types:   Possibility and Risk of blood transfusion discussed: No  NPO instructions  given:   Additional anesthetic preparation and risks discussed:   Needs early admission to pre-op area:   Other:     PAC Resident/NP Anesthesia Assessment:  Eduarda Lazar is a 77 year old female scheduled to undergo Replacement of Deep Brain Stimulator Generator/Battery, Model Activa SC, Located Over the Left Chest Wall on 2/13/18 with Dr. Francisco Wheat.    She has the following specific operative considerations:   1.  Dementia, with a history of post-procedure confusion: Recommend that medications such as Versed, and long-acting sedatives and narcotics, be avoided.    2.  Increased risk of postoperative nausea/vomiting: Recommend use of antiemetic agents in the perioperative period.    3.  Decreased range of motion of the neck with a slight tilt to the right, and arthritis of the cervical spine: Recommend that extra caution be exercised during positioning for any airway maneuvers in order to prevent injury or exacerbation of pain.    4.  Diagnosis of obstructive sleep apnea without CPAP use: Recommend careful positioning to prevent airway compromise and close monitoring of the patient's respiratory status.    5.  Asthma: Consider administration of a bronchodilator in the perioperative setting, if needed.    6.  Parkinson's disease with decreased mobility: Recommend that the patient be assisted with transfers and repositioning, and assistive devices be utilized.    7.  Obesity: Recommend careful positioning to prevent airway/ventilatory compromise, or tissue injury.    8.  Hgb, BMP today.    Revised Cardiac Risk Index: 0.4% risk of major adverse cardiac event.  PAXTON risk: Diagnosed with PAXTON,   PONV risk score= 3.  (If > 2, anti-emetic intervention is recommended.)  Anesthesia considerations: Refer to PAC assessment in the anesthesia records.      Reviewed and Signed by PAC Mid-Level Provider/Resident  Mid-Level Provider/Resident: Georgia Freeman CNP  Date: 2/7/18  Time:     Attending Anesthesiologist Anesthesia  Assessment:      Reviewed and Signed by PAC Anesthesiologist  Anesthesiologist: ludwig  Date: 2/9/18  Time:   Pass/Fail:   Disposition:     PAC Pharmacist Assessment:        Pharmacist:   Date:   Time:                           .

## 2018-02-07 NOTE — H&P
Pre-Operative H & P     Date of Encounter: 2/7/2018  Primary Care Physician:  Reyna Rivas    CC: Deep brain stimulator battery in the range for replacement.      HPI:  Eduarda Lazar is a 77 year old female who presents for pre-operative H & P in preparation for Replacement of Deep Brain Stimulator Generator/Battery, Model Activa SC, Located Over the Left Chest Wall on 2/13/18 with Dr. Francisco Wheat at [facility].   The patient was evaluated by Dr. Wheat on 2/5/18 in regards to battery replacement for her deep brain stimulator system.  The system was first placed in July 2009 with electrode implantation of the left subthalamic nuclei, followed by electrode implantation on the right subthalamic nuclei in January 2010.  She has since had the left and right batteries changed.  She was evaluated by her Neurologist on 1/6/18, and interrogation of the device revealed that the left-sided symptom is in the range for battery replacement.  Arrangements are now being made for the above procedure.    History is obtained from the patient and the medical record.    Past Medical History:  Past Medical History:   Diagnosis Date     Anxiety      Asthma 01/12/2005    Does not have an inhaler, and has not needed one for some time.       Cervical spine arthritis (H)      Depression      H/O Atrioventricular kennedy re-entry tachycardia (H) 10/01/2007    Treated with ablation.       Murmur, cardiac 11/11/2015     Obesity      Osteoarthritis of lumbar spine      Parkinson's disease (H)      Parkinson's disease dementia (H) neuropsych 2017 10/13/2017    IMPRESSIONS AND RECOMMENDATIONS   Current results indicate moderate dementia, characterized by significant impairments in learning and retrieval of learned information, executive dysfunction including impairments in the ability to establish and maintain cognitive set, and impairments in complex attentional processing, information and  psychomotor processing speed, and visual processing. Language ab     S/P deep brain stimulator placement      Sleep apnea     Does not use CPAP.     Past Surgical History:  Past Surgical History:   Procedure Laterality Date     APPENDECTOMY OPEN       BRAIN SURGERY Right 12 Jan 2010    right STN DBS lead     DILATION AND CURETTAGE      x 3     HYSTERECTOMY       IMPLANT PULSE GENERATOR SUBCUTANEOUS Right 17 Feb 2010    ipg soletra right side     IMPLANT PULSE GENERATOR SUBCUTANEOUS Left 12 aug 2009    left ipg     REPAIR BLADDER       REPLACE DEEP BRAIN STIMULATION GENERATOR / BATTERY Right 11/13/2015    Procedure: REPLACE DEEP BRAIN STIMULATION GENERATOR / BATTERY;  Surgeon: Francisco Wheat MD;  Location: [facility] OR     REPLACE PULSE GENERATOR BATTERY SUBCUTANEOUS  10/1/2013    Procedure: REPLACE PULSE GENERATOR BATTERY SUBCUTANEOUS;  Left Deep Brain Stimulator Battery Replacement;  Surgeon: Rodney Fajardo MD;  Location: [facility] OR     SEPTOPLASTY       STEREOTACTIC INSERTION DEEP BRAIN STIMULATION Left 22 jul 2009    left dbs lead     TONSILLECTOMY         Hx of Blood transfusions/reactions: Denies.     Hx of abnormal bleeding or anti-platelet use: Denies.    Menstrual history: No LMP recorded. Patient is postmenopausal.    Steroid use in the last year: Denies.    Personal or FH of difficulty with anesthesia: Pt's daughter notes that the patient had difficulty waking up after a dental procedure approximately 1 year ago.  Remained confused for some time postoperatively.     Prior to admission medications  Current Outpatient Prescriptions   Medication Sig Dispense Refill     carbidopa-levodopa (SINEMET)  MG per tablet Take 1/2 tab at 4 am, 1 tablet at 10 am, & 3 pm, and 1/2 tab 10 pm. 270 tablet 3     carbidopa-levodopa (SINEMET CR)  MG per CR tablet Take 1 tablet by mouth every 6 hours At 4 am, 10 am, 3 pm, and 10 pm 360 tablet 3     mirtazapine (REMERON) 15 MG tablet Take 0.5 tablets (7.5 mg) by  mouth At Bedtime 30 tablet      Lutein 20 MG TABS Take 20 mg by mouth daily 90 tablet 3     rivastigmine (EXELON) 4.6 MG/24HR 24 hr patch Place 1 patch onto the skin daily 30 patch 3     acetaminophen (TYLENOL) 325 MG tablet Take 1 tablet (325 mg) by mouth every 4 hours as needed for pain 100 tablet      rivastigmine (EXELON) 9.5 MG/24HR 24 hr patch Apply 4.6mg patch daily for 4 weeks then 9.5mg patch daily for 4 weeks then 13.3 mg patch daily (Patient taking differently: Place 1 patch onto the skin daily Apply 4.6mg patch daily for 4 weeks then 9.5mg patch daily for 4 weeks then 13.3 mg patch daily) 30 patch 11     Allergies  Allergies   Allergen Reactions     Barbiturates      Camphor Swelling and Other (See Comments)     Other reaction(s): Redness     Comtan      Elevated temperature, feeling worn out, dizzy and worse.  Dr. Agrawal had Rxd this.      Oxycodone      Sick to the stomach and feeling terrible all over     Vancomycin      Clindamycin Hcl Rash     Sulfa Drugs Rash     Social History  Social History     Social History     Marital status:      Spouse name: N/A     Number of children: N/A     Years of education: N/A     Occupational History     Not on file.     Social History Main Topics     Smoking status: Never Smoker     Smokeless tobacco: Never Used     Alcohol use No     Drug use: No     Sexual activity: Not on file     Other Topics Concern     Not on file     Social History Narrative    . Has three children. Lives alone in an apartment building.      Family History  Family History   Problem Relation Age of Onset     DIABETES Mother      CEREBROVASCULAR DISEASE Mother      HEART DISEASE Mother      Hypertension Father      CEREBROVASCULAR DISEASE Father      Coronary Artery Disease Father      DIABETES Sister      Review of Systems  Functional status: Dependent in ADL's, lives in a facility.  3 METS.     The complete review of systems is negative other than noted in the HPI or here.  "  Constitutional: Denies recent changes in weight, or fevers/chills.  Reports some difficulty sleeping as she recently changed rooms at her facility.    Eyes: Glasses for vision correction.  No recent vision changes.  EENT: Denies recent changes in hearing, mouth pain, or difficulty swallowing.  Cardiovascular: Denies chest pain, QUEZADA or orthopnea, or palpitations.  Respiratory: Denies shortness of breath or significant cough.    GI: Denies nausea/vomiting or diarrhea/constipation.    : Denies dysuria.  Reports that she was recently treated for a UTI, feels that her symptoms have resolved.    Musculoskeletal: Denies joint pain or swelling.    Skin: Denies rashes or wounds.    Hematologic: Denies easy bruising or bleeding.    Neurologic: Denies migraines, seizures, numbness/tingling.  Reports occasional episodes of numbness/tingling.    Psychiatric: Reports some anxiety and depression related to her medical issues, and need to move to a care facility.        /78  Pulse 70  Temp 97.5  F (36.4  C) (Oral)  Resp 16  Ht 1.702 m (5' 7\")  Wt 85.3 kg (188 lb)  SpO2 97%  BMI 29.44 kg/m2    188 lbs 0 oz  5' 7\"   Body mass index is 29.44 kg/(m^2).    Physical Exam  Constitutional: Patient awake, seated upright in a chair, in no apparent distress.  Appears stated age.  Eyes: Pupils equal, round and reactive to light.  Extra ocular muscles intact. Sclera clear.  Conjunctiva normal.  HENT: Head normocephalic.  Oral pharynx intact with moist mucous membranes.  Dentition intact.  No thyromegaly appreciated.   Respiratory: Lung sounds clear to auscultation bilaterally.  No rales, rhonchi, or wheezing noted.    Cardiovascular: S1, S2, regular rate and rhythm.  2/6 harsh systolic murmur noted.  No rubs, or gallops noted. Radial and pedal pulses palpable, bilaterally.  No edema noted.   GI: Bowel sounds present.  Abdomen rounded, soft, non-tender to light palpation.    Genitourinary: Exam deferred.  Lymph/Hematologic: No " cervical or supraclavicular lymphadenopathy noted.  No excessive bruising noted.    Skin: Color appropriate for race, warm, dry.  No rashes or wounds at anticipated surgical site.   Musculoskeletal: Slightly limited extension of the neck with a slight tilt of the head to the right.  No redness, warmth, or swelling of the joints noted. Gross motor strength is normal.    Neurologic: Alert, oriented to name, place and time. Cranial nerves II-XII are grossly intact. Deep brain stimulator generator sites over the left and right anterior chest walls, intact.  No signs/symptoms of infection.     Neuropsychiatric: Calm, cooperative. Normal affect.     Labs:  2/7/18: Hgb: 13.7  2/7/18: Na: 140; K: 3.9; Cl: 105; Glu: 95; BUN: 18; Cr: 0.78; Ca: 8.7    Lab results were personally reviewed by this provider.      Assessment and Plan  Eduarda Lazar is a 77 year old female scheduled to undergo Replacement of Deep Brain Stimulator Generator/Battery, Model Activa SC, Located Over the Left Chest Wall on 2/13/18 with Dr. Francisco Wheat.    She has the following specific operative considerations:   1.  Dementia, with a history of post-procedure confusion: Recommend that medications such as Versed, and long-acting sedatives and narcotics, be avoided.    2.  Increased risk of postoperative nausea/vomiting: Recommend use of antiemetic agents in the perioperative period.    3.  Decreased range of motion of the neck with a slight tilt to the right, and arthritis of the cervical spine: Recommend that extra caution be exercised during positioning for any airway maneuvers in order to prevent injury or exacerbation of pain.    4.  Diagnosis of obstructive sleep apnea without CPAP use: Recommend careful positioning to prevent airway compromise and close monitoring of the patient's respiratory status.    5.  Asthma: Consider administration of a bronchodilator in the perioperative setting, if needed.    6.  Parkinson's disease with decreased  mobility: Recommend that the patient be assisted with transfers and repositioning, and assistive devices be utilized.    7.  Obesity: Recommend careful positioning to prevent airway/ventilatory compromise, or tissue injury.    8.  Hgb, BMP today.    Revised Cardiac Risk Index: 0.4% risk of major adverse cardiac event.  PAXTON risk: Diagnosed with PAXTON,   PONV risk score= 3.  (If > 2, anti-emetic intervention is recommended.)  Anesthesia considerations: Refer to PAC assessment in the anesthesia records.    Patient was discussed with Dr. Cordon.    Georgia Freeman NP  Preoperative Assessment Center  [facility]  Phone: [phone]  Fax: [phone]

## 2018-02-07 NOTE — MR AVS SNAPSHOT
After Visit Summary   2018    Eduarda Lazar    MRN: [id number]           Patient Information     Date Of Birth          1940        Visit Information        Provider Department      2018 5:30 PM Rn, [facility]        Care Instructions    Preparing for Your Surgery      Name:  Eduarda Lazar   MRN:  [id number]   :  1940   Today's Date:  2018     Arriving for surgery:  Replace Deep Brain Stimulator Generator   Surgery date:  2018  Arrival time:  9:00 am  Please come to:       [facility] [address]  [address] [place]  [address]    -   parking is available in front of the hospital from 5:15 am to 8:00 pm    -  Stop at the Information Desk in the lobby    -   Inform the information person that you are here for surgery. An escort to 3c will be provided. If you would not like an escort, please proceed to 3C on the 3rd floor. [phone]     What can I eat or drink?  -  You may have solid food or milk products until 8 hours prior to your surgery.  Nothing after 3 am   -  You may have water, apple juice or 7up/Sprite until 2 hours prior to your surgery.  Until 9 am arrival time     Which medicines can I take?  -  Do NOT take these medications in the morning, the day of surgery:         Hold aspirin for 1 week before procedure       -  Please take these medications the day of surgery:     carbidopa-levodopa (Sinemet)     Mirtazapine (Remeron )      Continue Rivastigmine  (Exelon ) patch       How do I prepare myself?  -  Take two showers: one the night before surgery; and one the morning of surgery.         Use Scrubcare or Hibiclens to wash from neck down.  You may use your own shampoo and conditioner. No other hair products.   -  Do NOT use lotion, powder, deodorant, or antiperspirant the day of your surgery.  -  Do NOT wear any makeup, fingernail polish or jewelry.  -Do not bring  your own medications to the hospital, except for inhalers and eye drops.  -  Bring your ID and insurance card.    Questions or Concerns:  If you have questions or concerns, please call the  Preoperative Assessment Center, Monday-Friday 7AM-7PM:  [phone]                    Follow-ups after your visit        Your next 10 appointments already scheduled     Feb 07, 2018  4:30 PM CST   (Arrive by 4:15 PM)   PAC EVALUATION with  Pac Briana 3   [facility] ([facility])    [address] [place] [id number] [phone]            Feb 07, 2018  4:50 PM CST   (Arrive by 4:35 PM)   PAC Anesthesia Consult with  Pac Anesthesiologist   [facility] ([facility])    [address] [place] [id number] [phone]            Feb 07, 2018  5:30 PM CST   (Arrive by 5:15 PM)   PAC RN ASSESSMENT with Poli Pac Rn   [facility] ([facility])    [address] [place] [id number] [phone]            Feb 13, 2018   Procedure with Francisco Wheat MD   [facility], [place], Same Day Surgery (--)    [address] [facility] [id number] [phone]            May 15, 2018 12:25 PM CDT   (Arrive by 12:10 PM)   Return Movement Disorder with MIRZA Patel [facility] ([facility])    [address] [place] [id number] [phone]              Future tests that were ordered for you today     Open Future Orders        Priority Expected Expires Ordered    Hemoglobin Routine 2/7/2018 3/9/2018 2/7/2018    Basic metabolic panel Routine 2/7/2018 3/9/2018 2/7/2018            Who to contact     Please call your clinic at [phone] to:    Ask questions about your health    Make or cancel appointments    Discuss your medicines    Learn  about your test results    Speak to your doctor   If you have compliments or concerns about an experience at your clinic, or if you wish to file a complaint, please contact [facility] Patient Relations at [phone] or email us at [email or web address]         Additional Information About Your Visit        Duxterhart Information     [employer] gives you secure access to your electronic health record. If you see a primary care provider, you can also send messages to your care team and make appointments. If you have questions, please call your primary care clinic.  If you do not have a primary care provider, please call [phone] and they will assist you.      [employer] is an electronic gateway that provides easy, online access to your medical records. With [employer], you can request a clinic appointment, read your test results, renew a prescription or communicate with your care team.     To access your existing account, please contact your [facility] or call [phone] for assistance.        Care EveryWhere ID     This is your Care EveryWhere ID. This could be used by other organizations to access your [place] medical records  [id number]         Blood Pressure from Last 3 Encounters:   02/07/18 152/78   02/05/18 134/75   01/16/18 153/74    Weight from Last 3 Encounters:   02/07/18 85.3 kg (188 lb)   01/16/18 87.8 kg (193 lb 8 oz)   08/15/17 87.5 kg (192 lb 12.8 oz)              Today, you had the following     No orders found for display         Today's Medication Changes          These changes are accurate as of 2/7/18  4:22 PM.  If you have any questions, ask your nurse or doctor.               These medicines have changed or have updated prescriptions.        Dose/Directions    * rivastigmine 9.5 MG/24HR 24 hr patch   Commonly known as:  EXELON   This may have changed:    - how much to take  - how to take this  - when to take this  - additional  instructions   Used for:  Parkinson disease (H), Cognitive impairment        Apply 4.6mg patch daily for 4 weeks then 9.5mg patch daily for 4 weeks then 13.3 mg patch daily   Quantity:  30 patch   Refills:  11       * rivastigmine 4.6 MG/24HR 24 hr patch   Commonly known as:  EXELON   This may have changed:  Another medication with the same name was changed. Make sure you understand how and when to take each.   Used for:  Parkinson disease (H), Cognitive impairment        Dose:  1 patch   Place 1 patch onto the skin daily   Quantity:  30 patch   Refills:  3       * Notice:  This list has 2 medication(s) that are the same as other medications prescribed for you. Read the directions carefully, and ask your doctor or other care provider to review them with you.             Primary Care Provider Office Phone # Fax #    Reyna CARRANZA Deanrush [phone]       [facility] [address] [facility] [address]        Equal Access to Services     MANUEL HADLEY : Hadii aad ku hadasho Soshilaali, waaxda luqadaha, qaybta kaalmada adeegyada, mishel smalls . So [facility] [phone].    ATENCIÓN: Si habla español, tiene a art disposición servicios gratuitos de asistencia lingüística. [facility] [phone].    We comply with applicable federal civil rights laws and [place] laws. We do not discriminate on the basis of race, color, national origin, age, disability, sex, sexual orientation, or gender identity.            Thank you!     Thank you for choosing [facility]  for your care. Our goal is always to provide you with excellent care. Hearing back from our patients is one way we can continue to improve our services. Please take a few minutes to complete the written survey that you may receive in the mail after your visit with us. Thank you!             Your Updated Medication List - Protect others around you: Learn how to safely use, store and throw away  your medicines at www.disposemymeds.org.          This list is accurate as of 2/7/18  4:22 PM.  Always use your most recent med list.                   Brand Name Dispense Instructions for use Diagnosis    acetaminophen 325 MG tablet    TYLENOL    100 tablet    Take 1 tablet (325 mg) by mouth every 4 hours as needed for pain        * carbidopa-levodopa  MG per CR tablet    SINEMET CR    360 tablet    Take 1 tablet by mouth every 6 hours At 4 am, 10 am, 3 pm, and 10 pm    Parkinson disease (H)       * carbidopa-levodopa  MG per tablet    SINEMET    270 tablet    Take 1/2 tab at 4 am, 1 tablet at 10 am, & 3 pm, and 1/2 tab 10 pm.    Parkinson's disease (H)       Lutein 20 MG Tabs     90 tablet    Take 20 mg by mouth daily        REMERON 15 MG tablet   Generic drug:  mirtazapine     30 tablet    Take 0.5 tablets (7.5 mg) by mouth At Bedtime        * rivastigmine 9.5 MG/24HR 24 hr patch    EXELON    30 patch    Apply 4.6mg patch daily for 4 weeks then 9.5mg patch daily for 4 weeks then 13.3 mg patch daily    Parkinson disease (H), Cognitive impairment       * rivastigmine 4.6 MG/24HR 24 hr patch    EXELON    30 patch    Place 1 patch onto the skin daily    Parkinson disease (H), Cognitive impairment       * Notice:  This list has 4 medication(s) that are the same as other medications prescribed for you. Read the directions carefully, and ask your doctor or other care provider to review them with you.

## 2018-02-09 ENCOUNTER — TELEPHONE (OUTPATIENT)
Dept: NEUROLOGY | Facility: CLINIC | Age: 78
End: 2018-02-09

## 2018-02-09 NOTE — TELEPHONE ENCOUNTER
Marie called about the Exelon patch prescription. I did clarify that it is 4.6 mg for 4 weeks, then the 9.5 for 4 weeks then the 13.3. For patient's upcoming Deep Brain Stimulation battery replacement surgery she will have their NP do a H&P and send that to [facility] on Tuesday. Labs were already drawn here.

## 2018-02-09 NOTE — TELEPHONE ENCOUNTER
"Received a message to call \"Marie\" at [phone]. Left voice mail to call back with a detailed message.  "

## 2018-02-15 ENCOUNTER — RECORDS - HEALTHEAST (OUTPATIENT)
Dept: LAB | Facility: CLINIC | Age: 78
End: 2018-02-15

## 2018-02-15 LAB
FLUAV AG SPEC QL IA: ABNORMAL
FLUBV AG SPEC QL IA: ABNORMAL

## 2018-03-01 ENCOUNTER — ANESTHESIA EVENT (OUTPATIENT)
Dept: SURGERY | Facility: CLINIC | Age: 78
End: 2018-03-01
Payer: MEDICARE

## 2018-03-01 ENCOUNTER — TELEPHONE (OUTPATIENT)
Dept: NEUROLOGY | Facility: CLINIC | Age: 78
End: 2018-03-01

## 2018-03-01 DIAGNOSIS — F02.80 DEMENTIA DUE TO PARKINSON'S DISEASE WITHOUT BEHAVIORAL DISTURBANCE (H): Primary | ICD-10-CM

## 2018-03-01 DIAGNOSIS — G20.A1 DEMENTIA DUE TO PARKINSON'S DISEASE WITHOUT BEHAVIORAL DISTURBANCE (H): Primary | ICD-10-CM

## 2018-03-01 RX ORDER — RIVASTIGMINE TARTRATE 1.5 MG/1
CAPSULE ORAL
Qty: 120 CAPSULE | Refills: 11 | Status: SHIPPED | OUTPATIENT
Start: 2018-03-01 | End: 2018-03-02

## 2018-03-01 ASSESSMENT — ENCOUNTER SYMPTOMS
ORTHOPNEA: 0
DYSRHYTHMIAS: 1

## 2018-03-01 NOTE — ANESTHESIA PREPROCEDURE EVALUATION
Anesthesia Evaluation     . Pt has had prior anesthetic. Type: MAC    History of anesthetic complications    Pt's daughter notes that the pt had difficulty waking up after a dental procedure approximately 1 year ago.  Remained confused for some time postoperatively.        ROS/MED HX    ENT/Pulmonary:     (+)sleep apnea, asthma Last exacerbation: Does not have an inhaler at this time, and has not needed treatment for asthma.  ,doesn't use CPAP , . .   (-) recent URI   Neurologic:     (+)dementia, Parkinson's disease features: Tremors, difficulty walking, dementia. ,     Cardiovascular: Comment: History of AV node reentrant tachycardia, S/P ablation.      (+) ----. : . . . :. dysrhythmias Other, .      (-) CAD, taking anticoagulants/antiplatelets, QUEZADA and orthopnea/PND   METS/Exercise Tolerance:  3 - Able to walk 1-2 blocks without stopping   Hematologic:  - neg hematologic  ROS       Musculoskeletal:  - neg musculoskeletal ROS       GI/Hepatic:  - neg GI/hepatic ROS       Renal/Genitourinary:  - ROS Renal section negative       Endo:     (+) Obesity, .      Psychiatric:     (+) psychiatric history anxiety and depression      Infectious Disease:  - neg infectious disease ROS       Malignancy:      - no malignancy   Other:    - neg other ROS                 Physical Exam  Normal systems: dental    Airway   Mallampati: II  TM distance: >3 FB  Neck ROM: limited    Dental     Cardiovascular   Rhythm and rate: regular and normal  (+) murmur   (-) no peripheral edema    Pulmonary    breath sounds clear to auscultation    Other findings: 2/7/18: Hgb: 13.7  2/7/18: Na: 140; K: 3.9; Cl: 105; Glu: 95; BUN: 18; Cr: 0.78; Ca: 8.7           Lab Results   Component Value Date    WBC 8.7 11/14/2016    WBC 10.2 11/13/2016    WBC 17.2 (H) 11/12/2016    HGB 13.7 02/07/2018    HGB 12.5 11/14/2016    HGB 11.8 11/13/2016    HCT 38.2 11/14/2016    HCT 35.8 11/13/2016    HCT 46.1 11/12/2016     11/14/2016     11/13/2016      11/12/2016     02/07/2018     11/15/2016     11/14/2016    POTASSIUM 3.9 02/07/2018    POTASSIUM 3.6 11/15/2016    POTASSIUM 3.9 11/14/2016    CHLORIDE 105 02/07/2018    CHLORIDE 108 11/15/2016    CHLORIDE 109 11/14/2016    CO2 29 02/07/2018    CO2 30 11/15/2016    CO2 28 11/14/2016    BUN 18 02/07/2018    BUN 10 11/15/2016    BUN 10 11/14/2016    CR 0.78 02/07/2018    CR 0.85 11/15/2016    CR 0.83 11/14/2016    GLC 95 02/07/2018    GLC 99 11/15/2016    GLC 97 11/14/2016    SED 6 08/26/2014    NTBNPI 251 10/21/2007    TROPONIN 0.26 10/22/2007    TROPONIN 0.32 10/21/2007    TROPONIN 0.48 (HH) 10/21/2007    AST 21 08/26/2014    AST 30 10/21/2007    ALT 9 08/26/2014    ALT 18 10/21/2007    ALKPHOS 100 08/26/2014    ALKPHOS 102 10/21/2007    BILITOTAL 0.6 08/26/2014    BILITOTAL 0.3 10/21/2007    INR 1.06 11/13/2015    INR 1.08 10/01/2013    INR 0.99 07/22/2009       PAC Discussion and Assessment    ASA Classification: 3  Case is suitable for: [place]  Anesthetic techniques and relevant risks discussed: MAC with GA as backup  Invasive monitoring and risk discussed: No  Types:   Possibility and Risk of blood transfusion discussed: No  NPO instructions given:   Additional anesthetic preparation and risks discussed:   Needs early admission to pre-op area:   Other:     PAC Resident/NP Anesthesia Assessment:  Eduarda Lazar is a 77 year old female scheduled to undergo Replacement of Deep Brain Stimulator Generator/Battery, Model Activa SC, Located Over the Left Chest Wall on 2/13/18 with Dr. Francisco Wheat.    She has the following specific operative considerations:   1.  Dementia, with a history of post-procedure confusion: Recommend that medications such as Versed, and long-acting sedatives and narcotics, be avoided.    2.  Increased risk of postoperative nausea/vomiting: Recommend use of antiemetic agents in the perioperative period.    3.  Decreased range of motion of the neck with a slight  tilt to the right, and arthritis of the cervical spine: Recommend that extra caution be exercised during positioning for any airway maneuvers in order to prevent injury or exacerbation of pain.    4.  Diagnosis of obstructive sleep apnea without CPAP use: Recommend careful positioning to prevent airway compromise and close monitoring of the patient's respiratory status.    5.  Asthma: Consider administration of a bronchodilator in the perioperative setting, if needed.    6.  Parkinson's disease with decreased mobility: Recommend that the patient be assisted with transfers and repositioning, and assistive devices be utilized.    7.  Obesity: Recommend careful positioning to prevent airway/ventilatory compromise, or tissue injury.    8.  Hgb, BMP today.    Revised Cardiac Risk Index: 0.4% risk of major adverse cardiac event.  PAXTON risk: Diagnosed with PAXTON,   PONV risk score= 3.  (If > 2, anti-emetic intervention is recommended.)  Anesthesia considerations: Refer to PAC assessment in the anesthesia records.      Reviewed and Signed by PAC Mid-Level Provider/Resident  Mid-Level Provider/Resident: Georgia Freeman CNP  Date: 2/7/18  Time:     Attending Anesthesiologist Anesthesia Assessment:      Reviewed and Signed by PAC Anesthesiologist  Anesthesiologist: ludwig  Date: 2/9/18  Time:   Pass/Fail:   Disposition:     PAC Pharmacist Assessment:        Pharmacist:   Date:   Time:      Anesthesia Plan      History & Physical Review  History and physical reviewed and following examination; no interval change.    ASA Status:  3 .    NPO Status:  > 8 hours    Plan for General with Intravenous and Propofol induction. Maintenance will be Balanced.    PONV prophylaxis:  Ondansetron (or other 5HT-3) and Dexamethasone or Solumedrol  Additional equipment: Videolaryngoscope   -No Versed and minimal narcotics in setting of dementia and previous hx of post op confusion  -Preop Tylenol    Chichi Rogers MD  [facility] 3 Anesthesia Resident  Pager  [phone]       Postoperative Care  Postoperative pain management:  Multi-modal analgesia.      Consents  Anesthetic plan, risks, benefits and alternatives discussed with:  Patient.  Use of blood products discussed: No .   .                          .

## 2018-03-01 NOTE — TELEPHONE ENCOUNTER
"Marie from [facility] (where patient is) called because of the Exelon Patch 4.6MG/24 hr patch, she states that patient is itching so much from the patch that she says she \"feels like I want to jump off a bridge because it itches so much\" and it is leaving red welts and spots on her back even though they are cleaning area with soap and water after removing patch.   Her daughter is aware of this issue but would like for patient to continue on patch, so Marie would like to know if you would either;  Call her daughter and review the Exelon patch with her OR could patient possibly be prescribed a new medication since she has been on this one for over a year?     Marie would appreciate a call back @ [phone]  "

## 2018-03-01 NOTE — ANESTHESIA PREPROCEDURE EVALUATION
Anesthesia Evaluation     . Pt has had prior anesthetic. Type: MAC    History of anesthetic complications    Pt's daughter notes that the pt had difficulty waking up after a dental procedure approximately 1 year ago.  Remained confused for some time postoperatively.        ROS/MED HX    ENT/Pulmonary:     (+)sleep apnea, asthma Last exacerbation: Does not have an inhaler at this time, and has not needed treatment for asthma.  ,doesn't use CPAP , . .   (-) recent URI   Neurologic:     (+)dementia, Parkinson's disease features: Tremors, difficulty walking, dementia. ,     Cardiovascular: Comment: History of AV node reentrant tachycardia, S/P ablation.      (+) ----. : . . . :. dysrhythmias Other, .      (-) CAD, taking anticoagulants/antiplatelets, QUEZADA and orthopnea/PND   METS/Exercise Tolerance:  3 - Able to walk 1-2 blocks without stopping   Hematologic:  - neg hematologic  ROS       Musculoskeletal:  - neg musculoskeletal ROS       GI/Hepatic:  - neg GI/hepatic ROS       Renal/Genitourinary:  - ROS Renal section negative       Endo:     (+) Obesity, .      Psychiatric:     (+) psychiatric history anxiety and depression      Infectious Disease:  - neg infectious disease ROS       Malignancy:      - no malignancy   Other:    - neg other ROS                 Physical Exam  Normal systems: dental    Airway   Mallampati: II  TM distance: >3 FB  Neck ROM: limited    Dental     Cardiovascular   Rhythm and rate: regular and normal  (+) murmur   (-) no peripheral edema    Pulmonary    breath sounds clear to auscultation    Other findings:                                          .

## 2018-03-02 ENCOUNTER — ANESTHESIA (OUTPATIENT)
Dept: SURGERY | Facility: CLINIC | Age: 78
End: 2018-03-02
Payer: MEDICARE

## 2018-03-02 ENCOUNTER — HOSPITAL ENCOUNTER (OUTPATIENT)
Facility: CLINIC | Age: 78
Discharge: HOME | End: 2018-03-02
Attending: NEUROLOGICAL SURGERY | Admitting: NEUROLOGICAL SURGERY
Payer: MEDICARE

## 2018-03-02 VITALS
HEIGHT: 67 IN | DIASTOLIC BLOOD PRESSURE: 46 MMHG | RESPIRATION RATE: 16 BRPM | BODY MASS INDEX: 28.82 KG/M2 | OXYGEN SATURATION: 95 % | WEIGHT: 183.64 LBS | SYSTOLIC BLOOD PRESSURE: 114 MMHG | TEMPERATURE: 97.5 F

## 2018-03-02 DIAGNOSIS — F02.80 DEMENTIA DUE TO PARKINSON'S DISEASE WITHOUT BEHAVIORAL DISTURBANCE (H): ICD-10-CM

## 2018-03-02 DIAGNOSIS — Z45.42 END OF BATTERY LIFE OF DEEP BRAIN STIMULATOR: Primary | ICD-10-CM

## 2018-03-02 DIAGNOSIS — G20.A1 DEMENTIA DUE TO PARKINSON'S DISEASE WITHOUT BEHAVIORAL DISTURBANCE (H): ICD-10-CM

## 2018-03-02 LAB
ANION GAP SERPL CALCULATED.3IONS-SCNC: 6 MMOL/L (ref 3–14)
APTT PPP: 27 SEC (ref 22–37)
BUN SERPL-MCNC: 17 MG/DL (ref 7–30)
CALCIUM SERPL-MCNC: 8.3 MG/DL (ref 8.5–10.1)
CHLORIDE SERPL-SCNC: 108 MMOL/L (ref 94–109)
CO2 SERPL-SCNC: 29 MMOL/L (ref 20–32)
COPATH REPORT: NORMAL
CREAT SERPL-MCNC: 0.81 MG/DL (ref 0.52–1.04)
ERYTHROCYTE [DISTWIDTH] IN BLOOD BY AUTOMATED COUNT: 13.8 % (ref 10–15)
GFR SERPL CREATININE-BSD FRML MDRD: 68 ML/MIN/1.7M2
GLUCOSE BLDC GLUCOMTR-MCNC: 81 MG/DL (ref 70–99)
GLUCOSE SERPL-MCNC: 96 MG/DL (ref 70–99)
HCT VFR BLD AUTO: 40 % (ref 35–47)
HGB BLD-MCNC: 13.2 G/DL (ref 11.7–15.7)
INR PPP: 1.09 (ref 0.86–1.14)
INTERPRETATION ECG - MUSE: NORMAL
MCH RBC QN AUTO: 30.8 PG (ref 26.5–33)
MCHC RBC AUTO-ENTMCNC: 33 G/DL (ref 31.5–36.5)
MCV RBC AUTO: 94 FL (ref 78–100)
PLATELET # BLD AUTO: 178 10E9/L (ref 150–450)
POTASSIUM SERPL-SCNC: 4.2 MMOL/L (ref 3.4–5.3)
RBC # BLD AUTO: 4.28 10E12/L (ref 3.8–5.2)
SODIUM SERPL-SCNC: 143 MMOL/L (ref 133–144)
WBC # BLD AUTO: 7.5 10E9/L (ref 4–11)

## 2018-03-02 PROCEDURE — 25000128 H RX IP 250 OP 636: Performed by: NEUROLOGICAL SURGERY

## 2018-03-02 PROCEDURE — 25000125 ZZHC RX 250: Performed by: NEUROLOGICAL SURGERY

## 2018-03-02 PROCEDURE — 85610 PROTHROMBIN TIME: CPT | Performed by: NEUROLOGICAL SURGERY

## 2018-03-02 PROCEDURE — 80048 BASIC METABOLIC PNL TOTAL CA: CPT | Performed by: NEUROLOGICAL SURGERY

## 2018-03-02 PROCEDURE — 36000057 ZZH SURGERY LEVEL 3 1ST 30 MIN - UMMC: Performed by: NEUROLOGICAL SURGERY

## 2018-03-02 PROCEDURE — 27210794 ZZH OR GENERAL SUPPLY STERILE: Performed by: NEUROLOGICAL SURGERY

## 2018-03-02 PROCEDURE — 85730 THROMBOPLASTIN TIME PARTIAL: CPT | Performed by: NEUROLOGICAL SURGERY

## 2018-03-02 PROCEDURE — 93005 ELECTROCARDIOGRAM TRACING: CPT

## 2018-03-02 PROCEDURE — 25000125 ZZHC RX 250: Performed by: ANESTHESIOLOGY

## 2018-03-02 PROCEDURE — 27210995 ZZH RX 272: Performed by: NEUROLOGICAL SURGERY

## 2018-03-02 PROCEDURE — 37000009 ZZH ANESTHESIA TECHNICAL FEE, EACH ADDTL 15 MIN: Performed by: NEUROLOGICAL SURGERY

## 2018-03-02 PROCEDURE — 85027 COMPLETE CBC AUTOMATED: CPT | Performed by: NEUROLOGICAL SURGERY

## 2018-03-02 PROCEDURE — 71000015 ZZH RECOVERY PHASE 1 LEVEL 2 EA ADDTL HR: Performed by: NEUROLOGICAL SURGERY

## 2018-03-02 PROCEDURE — 36000059 ZZH SURGERY LEVEL 3 EA 15 ADDTL MIN UMMC: Performed by: NEUROLOGICAL SURGERY

## 2018-03-02 PROCEDURE — 93010 ELECTROCARDIOGRAM REPORT: CPT | Performed by: INTERNAL MEDICINE

## 2018-03-02 PROCEDURE — 25000566 ZZH SEVOFLURANE, EA 15 MIN: Performed by: NEUROLOGICAL SURGERY

## 2018-03-02 PROCEDURE — C9399 UNCLASSIFIED DRUGS OR BIOLOG: HCPCS | Performed by: ANESTHESIOLOGY

## 2018-03-02 PROCEDURE — 25000132 ZZH RX MED GY IP 250 OP 250 PS 637: Mod: GY | Performed by: ANESTHESIOLOGY

## 2018-03-02 PROCEDURE — 37000008 ZZH ANESTHESIA TECHNICAL FEE, 1ST 30 MIN: Performed by: NEUROLOGICAL SURGERY

## 2018-03-02 PROCEDURE — 40000065 ZZH STATISTIC EKG NON-CHARGEABLE

## 2018-03-02 PROCEDURE — 82962 GLUCOSE BLOOD TEST: CPT

## 2018-03-02 PROCEDURE — 71000014 ZZH RECOVERY PHASE 1 LEVEL 2 FIRST HR: Performed by: NEUROLOGICAL SURGERY

## 2018-03-02 PROCEDURE — 25000128 H RX IP 250 OP 636: Performed by: ANESTHESIOLOGY

## 2018-03-02 PROCEDURE — A9270 NON-COVERED ITEM OR SERVICE: HCPCS | Mod: GY | Performed by: ANESTHESIOLOGY

## 2018-03-02 PROCEDURE — 40000171 ZZH STATISTIC PRE-PROCEDURE ASSESSMENT III: Performed by: NEUROLOGICAL SURGERY

## 2018-03-02 PROCEDURE — 88300 SURGICAL PATH GROSS: CPT | Performed by: NEUROLOGICAL SURGERY

## 2018-03-02 PROCEDURE — 71000016 ZZH RECOVERY PHASE 1 LEVEL 3 FIRST HR: Performed by: NEUROLOGICAL SURGERY

## 2018-03-02 PROCEDURE — C1767 GENERATOR, NEURO NON-RECHARG: HCPCS | Performed by: NEUROLOGICAL SURGERY

## 2018-03-02 PROCEDURE — 71000027 ZZH RECOVERY PHASE 2 EACH 15 MINS: Performed by: NEUROLOGICAL SURGERY

## 2018-03-02 PROCEDURE — 71000017 ZZH RECOVERY PHASE 1 LEVEL 3 EA ADDTL HR: Performed by: NEUROLOGICAL SURGERY

## 2018-03-02 DEVICE — NEUROSTIMULATOR MEDT ACTIVA SC 37602: Type: IMPLANTABLE DEVICE | Site: CHEST  WALL | Status: FUNCTIONAL

## 2018-03-02 RX ORDER — CEPHALEXIN 500 MG/1
500 CAPSULE ORAL 2 TIMES DAILY
Qty: 14 CAPSULE | Refills: 0 | Status: SHIPPED | OUTPATIENT
Start: 2018-03-02 | End: 2018-11-15

## 2018-03-02 RX ORDER — ONDANSETRON 2 MG/ML
4 INJECTION INTRAMUSCULAR; INTRAVENOUS EVERY 30 MIN PRN
Status: DISCONTINUED | OUTPATIENT
Start: 2018-03-02 | End: 2018-03-02 | Stop reason: HOSPADM

## 2018-03-02 RX ORDER — ONDANSETRON 2 MG/ML
INJECTION INTRAMUSCULAR; INTRAVENOUS PRN
Status: DISCONTINUED | OUTPATIENT
Start: 2018-03-02 | End: 2018-03-02

## 2018-03-02 RX ORDER — PROPOFOL 10 MG/ML
INJECTION, EMULSION INTRAVENOUS PRN
Status: DISCONTINUED | OUTPATIENT
Start: 2018-03-02 | End: 2018-03-02

## 2018-03-02 RX ORDER — ACETAMINOPHEN 325 MG/1
975 TABLET ORAL ONCE
Status: COMPLETED | OUTPATIENT
Start: 2018-03-02 | End: 2018-03-02

## 2018-03-02 RX ORDER — PROPOFOL 10 MG/ML
INJECTION, EMULSION INTRAVENOUS CONTINUOUS PRN
Status: DISCONTINUED | OUTPATIENT
Start: 2018-03-02 | End: 2018-03-02

## 2018-03-02 RX ORDER — FENTANYL CITRATE 50 UG/ML
INJECTION, SOLUTION INTRAMUSCULAR; INTRAVENOUS PRN
Status: DISCONTINUED | OUTPATIENT
Start: 2018-03-02 | End: 2018-03-02

## 2018-03-02 RX ORDER — CEPHALEXIN 500 MG/1
500 CAPSULE ORAL 2 TIMES DAILY
Qty: 14 CAPSULE | Refills: 0 | Status: SHIPPED | OUTPATIENT
Start: 2018-03-02 | End: 2018-03-02

## 2018-03-02 RX ORDER — RIVASTIGMINE TARTRATE 1.5 MG/1
CAPSULE ORAL
Qty: 120 CAPSULE | Refills: 11 | Status: SHIPPED | OUTPATIENT
Start: 2018-03-02 | End: 2018-05-16

## 2018-03-02 RX ORDER — ONDANSETRON 4 MG/1
4 TABLET, ORALLY DISINTEGRATING ORAL EVERY 30 MIN PRN
Status: DISCONTINUED | OUTPATIENT
Start: 2018-03-02 | End: 2018-03-02 | Stop reason: HOSPADM

## 2018-03-02 RX ORDER — LIDOCAINE HYDROCHLORIDE 20 MG/ML
INJECTION, SOLUTION INFILTRATION; PERINEURAL PRN
Status: DISCONTINUED | OUTPATIENT
Start: 2018-03-02 | End: 2018-03-02

## 2018-03-02 RX ORDER — DEXAMETHASONE SODIUM PHOSPHATE 4 MG/ML
INJECTION, SOLUTION INTRA-ARTICULAR; INTRALESIONAL; INTRAMUSCULAR; INTRAVENOUS; SOFT TISSUE PRN
Status: DISCONTINUED | OUTPATIENT
Start: 2018-03-02 | End: 2018-03-02

## 2018-03-02 RX ORDER — TRAMADOL HYDROCHLORIDE 50 MG/1
50 TABLET ORAL EVERY 6 HOURS PRN
Qty: 20 TABLET | Refills: 0 | Status: SHIPPED | OUTPATIENT
Start: 2018-03-02 | End: 2018-03-02

## 2018-03-02 RX ORDER — IPRATROPIUM BROMIDE AND ALBUTEROL SULFATE 2.5; .5 MG/3ML; MG/3ML
3 SOLUTION RESPIRATORY (INHALATION) ONCE
Status: COMPLETED | OUTPATIENT
Start: 2018-03-02 | End: 2018-03-02

## 2018-03-02 RX ORDER — ALBUTEROL SULFATE 0.83 MG/ML
2.5 SOLUTION RESPIRATORY (INHALATION) ONCE
Status: DISCONTINUED | OUTPATIENT
Start: 2018-03-02 | End: 2018-03-02 | Stop reason: CLARIF

## 2018-03-02 RX ORDER — TRAMADOL HYDROCHLORIDE 50 MG/1
50 TABLET ORAL EVERY 6 HOURS PRN
Qty: 20 TABLET | Refills: 0 | Status: SHIPPED | OUTPATIENT
Start: 2018-03-02 | End: 2018-11-15

## 2018-03-02 RX ORDER — SODIUM CHLORIDE, SODIUM LACTATE, POTASSIUM CHLORIDE, CALCIUM CHLORIDE 600; 310; 30; 20 MG/100ML; MG/100ML; MG/100ML; MG/100ML
INJECTION, SOLUTION INTRAVENOUS CONTINUOUS PRN
Status: DISCONTINUED | OUTPATIENT
Start: 2018-03-02 | End: 2018-03-02

## 2018-03-02 RX ORDER — EPHEDRINE SULFATE 50 MG/ML
INJECTION, SOLUTION INTRAMUSCULAR; INTRAVENOUS; SUBCUTANEOUS PRN
Status: DISCONTINUED | OUTPATIENT
Start: 2018-03-02 | End: 2018-03-02

## 2018-03-02 RX ORDER — NALOXONE HYDROCHLORIDE 0.4 MG/ML
.1-.4 INJECTION, SOLUTION INTRAMUSCULAR; INTRAVENOUS; SUBCUTANEOUS
Status: DISCONTINUED | OUTPATIENT
Start: 2018-03-02 | End: 2018-03-02 | Stop reason: HOSPADM

## 2018-03-02 RX ORDER — SODIUM CHLORIDE, SODIUM LACTATE, POTASSIUM CHLORIDE, CALCIUM CHLORIDE 600; 310; 30; 20 MG/100ML; MG/100ML; MG/100ML; MG/100ML
INJECTION, SOLUTION INTRAVENOUS CONTINUOUS
Status: DISCONTINUED | OUTPATIENT
Start: 2018-03-02 | End: 2018-03-02 | Stop reason: HOSPADM

## 2018-03-02 RX ORDER — CEFAZOLIN SODIUM 1 G/3ML
1 INJECTION, POWDER, FOR SOLUTION INTRAMUSCULAR; INTRAVENOUS SEE ADMIN INSTRUCTIONS
Status: DISCONTINUED | OUTPATIENT
Start: 2018-03-02 | End: 2018-03-02 | Stop reason: HOSPADM

## 2018-03-02 RX ORDER — CEFAZOLIN SODIUM 2 G/100ML
2 INJECTION, SOLUTION INTRAVENOUS
Status: COMPLETED | OUTPATIENT
Start: 2018-03-02 | End: 2018-03-02

## 2018-03-02 RX ADMIN — PROPOFOL 20 MG: 10 INJECTION, EMULSION INTRAVENOUS at 10:08

## 2018-03-02 RX ADMIN — SUGAMMADEX 170 MG: 100 INJECTION, SOLUTION INTRAVENOUS at 09:48

## 2018-03-02 RX ADMIN — PROPOFOL 20 MCG/KG/MIN: 10 INJECTION, EMULSION INTRAVENOUS at 09:13

## 2018-03-02 RX ADMIN — DEXAMETHASONE SODIUM PHOSPHATE 4 MG: 4 INJECTION, SOLUTION INTRA-ARTICULAR; INTRALESIONAL; INTRAMUSCULAR; INTRAVENOUS; SOFT TISSUE at 09:32

## 2018-03-02 RX ADMIN — FENTANYL CITRATE 50 MCG: 50 INJECTION, SOLUTION INTRAMUSCULAR; INTRAVENOUS at 09:02

## 2018-03-02 RX ADMIN — CEFAZOLIN SODIUM 2 G: 2 INJECTION, SOLUTION INTRAVENOUS at 09:25

## 2018-03-02 RX ADMIN — PHENYLEPHRINE HYDROCHLORIDE 100 MCG: 10 INJECTION, SOLUTION INTRAMUSCULAR; INTRAVENOUS; SUBCUTANEOUS at 09:23

## 2018-03-02 RX ADMIN — PROPOFOL 20 MG: 10 INJECTION, EMULSION INTRAVENOUS at 09:10

## 2018-03-02 RX ADMIN — PHENYLEPHRINE HYDROCHLORIDE 100 MCG: 10 INJECTION, SOLUTION INTRAMUSCULAR; INTRAVENOUS; SUBCUTANEOUS at 09:48

## 2018-03-02 RX ADMIN — Medication 5 MG: at 09:46

## 2018-03-02 RX ADMIN — ACETAMINOPHEN 975 MG: 325 TABLET, FILM COATED ORAL at 08:42

## 2018-03-02 RX ADMIN — PROPOFOL 80 MG: 10 INJECTION, EMULSION INTRAVENOUS at 09:06

## 2018-03-02 RX ADMIN — ROCURONIUM BROMIDE 50 MG: 10 INJECTION INTRAVENOUS at 09:06

## 2018-03-02 RX ADMIN — ONDANSETRON 4 MG: 2 INJECTION INTRAMUSCULAR; INTRAVENOUS at 09:33

## 2018-03-02 RX ADMIN — IPRATROPIUM BROMIDE AND ALBUTEROL SULFATE 3 ML: .5; 3 SOLUTION RESPIRATORY (INHALATION) at 10:30

## 2018-03-02 RX ADMIN — IPRATROPIUM BROMIDE AND ALBUTEROL SULFATE 3 ML: .5; 3 SOLUTION RESPIRATORY (INHALATION) at 11:23

## 2018-03-02 RX ADMIN — LIDOCAINE HYDROCHLORIDE 80 MG: 20 INJECTION, SOLUTION INFILTRATION; PERINEURAL at 09:06

## 2018-03-02 RX ADMIN — FENTANYL CITRATE 25 MCG: 50 INJECTION, SOLUTION INTRAMUSCULAR; INTRAVENOUS at 09:30

## 2018-03-02 RX ADMIN — SODIUM CHLORIDE, POTASSIUM CHLORIDE, SODIUM LACTATE AND CALCIUM CHLORIDE: 600; 310; 30; 20 INJECTION, SOLUTION INTRAVENOUS at 08:56

## 2018-03-02 RX ADMIN — PROPOFOL 20 MG: 10 INJECTION, EMULSION INTRAVENOUS at 10:05

## 2018-03-02 NOTE — TELEPHONE ENCOUNTER
Added Exelon patch to allergy list.     Would recommend Exelon (rivastigmine) capsules instead. Rx has been ordered by Dr. Cardoza.    July Quan, Pharm.D.  Medication Therapy Management Resident  Phone: [phone]  Pager: [phone]

## 2018-03-02 NOTE — OR NURSING
Upon arrival to PACU, pt very restless and pulling at lines and monitors.  Pt would not cooperate with staff when giving verbal commands.  RN/writer noticed significant wheezing when ausculting breath sounds.  Duoneb was verbally ordered by Anesthesia Resident Chichi Rogers.

## 2018-03-02 NOTE — OP NOTE
PATIENT NAME: PATIENCE DAVIS  YOB: 1940  MRN:   [id number]  ACCOUNT:  [id number]      DATE OF PROCEDURE:  03/02/2018    PREOPERATIVE DIAGNOSIS:   1.  Parkinson's disease.  2.  S/p left-sided deep brain stimulator placement in 07/2009; last generator/battery replacement in 10/2013  3.  S/p right-sided deep brain stimulator placement in 01/2010; last generator/battery replacement in 11/2015  4.  Depleted deep brain stimulator generator/battery over the left chest wall.    POSTOPERATIVE DIAGNOSIS: Battery nearing end of life.   1.  Parkinson's disease.  2.  S/p left-sided deep brain stimulator placement in 07/2009; last generator/battery replacement in 10/2013  3.  S/p right-sided deep brain stimulator placement in 01/2010; last generator/battery replacement in 11/2015  4.  Depleted deep brain stimulator generator/battery over the left chest wall.    PROCEDURES PERFORMED:  1.  Replacement of deep brain stimulator generator/battery, model Activa SC, over the left chest wall with connection to one electrode array.  2.  Revision of the left chest wall generator/battery pocket.  3.  Electrical interrogation and analysis of the left side deep brain stimulator system    ATTENDING SURGEON:  Francisco Wheat MD, PhD    FIRST ASSISTANT:  Nay Leos MD    ANESTHESIA:  General anesthesia and local anesthetic.     ESTIMATED BLOOD LOSS:  1 mL.    COMPLICATIONS:  None.    FINDINGS:  No evidence of infection.  All impedances within normal.  No problems found.    IMPLANTS:  Medtronic DBS generator/battery, Activa SC model #:24673, Serial #: [id number]    INDICATIONS FOR PROCEDURE:  Ms. Patience Davis is a 77 year old woman who underwent deep brain stimulator electrode implantation of the bilateral subthalamic nuclei, left side in 07/2009 and right side in 01/2010.  Her left battery was replaced in 2013 and her right side was replaced in 11/2015.  She was recently seen by Dr. Cardoza on 1/6/2018 at which  time her batteries where interrogated.  Her right side was at 2.90 Volts and her left side was 2.65 Volts.  Her left side is in the range where replacement is recommended.  There is some concern regarding her general health.  Her daughter reports a difficulty waking up from anesthesia after a dental procedure about a year ago.  She had no other anesthesia or procedures since then.  She reportedly was confused and was slow to get back to her baseline.  No specific reason was found, per family.  Dr. Cardoza was not sure if patient would tolerated the procedure given her overall declining general health.  Based on his notes or input from the patient's family, it is unclear if the patient is receiving any therapeutic benefit from the DBS.  Based on my note after the last DBS generator/battery replacement on the right side, patient had improvement of her tremor symptoms.  Currently, there is no report of problems with the device and no abnormal shocking or stimulation effects are reported.  There are no issues with the location of the generator/battery or the wound itself.  The patient wishes to continue to receive tremor control from her DBS system and undergo replacement of the left side. Family also feel that she should have the device replaced, if she can under the surgery.  Her right-side system will likely be due for replacement in the next 2-3 years.  We discussed the surgical procedure for replacing the DBS generator/battery over the left chest wall.  Risks, benefits, alternative therapies were discussed with the patient, including but not limited to infection and bleeding.  This surgical procedure will be performed under general anesthesia and local anesthetic, per anesthesia.  The thinned part of the wound/pocket may be corrected with mobilization of the tissue under the incision.  The pocket may need to be enlarged to minimize the stress on the closure.  Surgical procedure was discussed in detail.  All questions  were answered, and she expressed understanding.    DESCRIPTION OF PROCEDURE:  Informed consent was obtained and her left chest was marked, specifically the previous incision.  She was brought to the operating theater and was transferred onto the operating table in a supine position and her right arm placed out to her side.  her left arm was tucked.  All pressure points were padded appropriately.  With the placement of endotracheal tube, general anesthesia was obtained.  Her scar and previous incision over the left chest wall was prepped and draped in the usual sterile fashion.  Time out was performed confirming the patient's identity, procedure to be performed, site and side of the surgery and the administration of prophylactic preoperative antibiotic.  Lidocaine 1% with 1:100,000 epinephrine mixed with Marcaine 1/4% plain, 50:50 mix, was injected in the subcutaneous space at the intended incision site, which is the previously well healed incision.  Total of 10 mL was used.    We used a #10 blade scalpel to make the skin incision, going over the previously well healed scar.  We carried our dissection through the dermal layer until we reached the subcutaneous fat and then the generator/battery capsule.  The plasma blade was used to dissect the subcutaneous tissue and to open the capsule, taking great care not to damage the hardware.  We gently opened up the fibrous capsule surrounding the generator/battery to expose it.  Care was taken to open the capsule while lifting the capsule itself away from the generator/battery, taking great care not to come in contact with the metal casing or the wire.  The generator/battery was mobile and we were able to remove it easily, taking care to attend to the location of the wire.  The scar surrounding and anchoring the extension wire was carefully dissected and wire freed.  The extension wire was examined and was found to be without any damage or erosion or defect.  There were no  obvious signs of infection.    The old generator/battery, Activa SC, was disconnected from the extension wire and taken off the field.  The connector contacts for the extension wire was cleaned.  The new generator/battery, Medtronic model Activa SC, was connected to the extension wire, thus one electrode array, and secured.    We then performed blunt and sharp dissection within the pocket after the generator/battery had been removed in order to revise the chest wall pocket for the new generator/battery, so as to reduce the tension on the wound closure.  We also dissected free and undermined the superior aspect of the pocket so that the closure would have less tension after closure.  The entire cavity was copiously irrigated with antibiotic-impregnated saline and meticulous hemostasis was obtained and the pocket was dried.  And then the new generator/battery was placed back into the pocket with the excess extension wire being placed posteriorly and around the periphery of the generator/battery.  It was secured to the new position within the pocket using two 2-0 Ethibond sutures.    The left side DBS system was tested and interrogated electrically and was found to be working well and impedances were noted to be within normal.  No problems were found.    After this, we turned our attention to closing.  We reapproximated the fibrous capsule of the generator/battery using 3-0 Vicryl sutures in a simple interrupted fashion.  The subcutaneous fat layer was then closed using 3-0 Vicryl sutures in an inverted interrupted fashion.  The dermal layer was then closed using 3-0 Vicryl sutures in an inverted interrupted fashion.  The skin was then closed using a 4-0 Monocryl suture in a running subcuticular fashion.  The wound was then cleaned with wet and dry sponges followed by ChloraPrep and then Dermabond was applied.    At the end of the entire operation, the drapes were taken down and the patient was awakened and extubated  without any difficulty.  She was then transferred back onto her hospital [facility] for transport to the postanesthesia care unit for ongoing recovery.  At the end of the case, all counts including needle, sponge and instrument counts were correct x 2.  There were no complications.    Dr. Wheat was present and scrubbed for the entirety of the case.       FRANCISCO WHEAT MD      As prepared by DAYAN BRAR MD, MS  Neurosurgery PGY-1      NEUROSURGERY ATTENDING ATTESTATION: I, Francisco Wheat M.D., Ph.D., Neurosurgery Attending, was present and scrubbed for the entire case and performed the key and critical portions of the case.

## 2018-03-02 NOTE — OR NURSING
Patient agreed to have only one set of vital signs taken due to emotional/baseline behavior.  Aixa, daughter, present at the bedside.

## 2018-03-02 NOTE — TELEPHONE ENCOUNTER
"Prescription was local printed and signed by Dr. Cardoza, then faxed to [facility] at the number below at the request of July Quan PharmD. Confirmed with [facility] that they have received this and that this prescription will be replacing the patch. Since [facility] does not accept e-scripts or transfers, previous order was canceled by July Quan.     Called back to confirm they will be stopping the patch as I overheard \"she doesn't know\" before hanging up. Spoke with Divina, one of the nurses who said she previously took verbal order to stop patches and replace with capsules. She said this may confuse some of the nurses, and that they require a written stop order to officially stop patches. It can be a statement that says \"to replace patch\" or \"stop patch\".    Sunshine Yuan PharmD  [facility] Pharmacist     "

## 2018-03-02 NOTE — TELEPHONE ENCOUNTER
Spoke with daughter, Aixa, who was currently at the [facility]. Informed her that the Exelon patch was changed to Exelon capsules. [facility] is requesting a fax of the prescription so that they can get it to the pharmacy they use. Fax to [phone].     Called [facility] in [place] to cancel the RX sent there.    July Quan, Pharm.D.  Medication Therapy Management Resident  Phone: [phone]  Pager: [phone]

## 2018-03-02 NOTE — DISCHARGE INSTRUCTIONS
[facility]  Same-Day Surgery   Adult Discharge Orders & Instructions     For 24 hours after surgery    1. Get plenty of rest.  A responsible adult must stay with you for at least 24 hours after you leave the hospital.   2. Do not drive or use heavy equipment.  If you have weakness or tingling, don't drive or use heavy equipment until this feeling goes away.  3. Do not drink alcohol.  4. Avoid strenuous or risky activities.  Ask for help when climbing stairs.   5. You may feel lightheaded.  IF so, sit for a few minutes before standing.  Have someone help you get up.   6. If you have nausea (feel sick to your stomach): Drink only clear liquids such as apple juice, ginger ale, broth or 7-Up.  Rest may also help.  Be sure to drink enough fluids.  Move to a regular diet as you feel able.  7. You may have a slight fever. Call the doctor if your fever is over 100 F (37.7 C) (taken under the tongue) or lasts longer than 24 hours.  8. You may have a dry mouth, a sore throat, muscle aches or trouble sleeping.  These should go away after 24 hours.  9. Do not make important or legal decisions.   Call your doctor for any of the followin.  Signs of infection (fever, growing tenderness at the surgery site, a large amount of drainage or bleeding, severe pain, foul-smelling drainage, redness, swelling).    2. It has been over 8 to 10 hours since surgery and you are still not able to urinate (pass water).    3.  Headache for over 24 hours.           To contact a doctor, call Dr. Wheat's office at [phone]-- Neurosurgery or:    x   [phone] and ask for the resident on call for \NeuroSurgery (answered 24 hours a day)  x   Emergency Department:    [facility]: [phone]       (TTY for hearing impaired: [phone])

## 2018-03-02 NOTE — ANESTHESIA POSTPROCEDURE EVALUATION
Patient: Eduarda Lazar    Procedure(s):  Replacement Of Deep Brain Stimulator Generator/Battery, Model Activa SC, Over The Left Chest Wall - Wound Class: I-Clean    Diagnosis:Parkinson's Disease   Diagnosis Additional Information: No value filed.    Anesthesia Type:  General    Note:  Anesthesia Post Evaluation    Patient location during evaluation: PACU  Patient participation: Able to fully participate in evaluation  Level of consciousness: confused and awake  Pain management: adequate  Airway patency: patent  Cardiovascular status: acceptable  Respiratory status: acceptable  Hydration status: acceptable  PONV: none             Last vitals:  Vitals:    03/02/18 1030 03/02/18 1045 03/02/18 1100   BP: 127/56 120/62 124/62   Resp: 20 23 21   Temp: 36.7  C (98  F)  36.4  C (97.5  F)   SpO2: 99% 98% 98%         Electronically Signed By: Kristian Velazquez MD  March 2, 2018  11:30 AM    Patient with wheezing upon arrival to PACU.  Treated with Duo neb, will monitor in stage 2

## 2018-03-02 NOTE — BRIEF OP NOTE
[facility], [place]  Neurosurgery Brief Operative Note    Pre-operative diagnosis: Parkinson's Disease, Depleted DBS generator/battery over left chest wall   Post-operative diagnosis Parkinson's Disease, Depleted DBS generator/battery over left chest wall.   Procedure: Procedure(s):  Replacement Of Deep Brain Stimulator Generator/Battery, Model Activa SC, Over The Left Chest Wall - Wound Class: I-Clean   Surgeon: Francisco Wheat MD   Assistants(s): Nay Leos MD   Anesthesia: General Anesthesia    Estimated blood loss: 1 ml   Drains: None   Specimens: None   Findings: No signs of infection.  No hardware defect noted.  All impedances within normal.  No problems found.   Condition:  Implant: Stable.  [employer] Activa SC, model 50793, Serial #[id number]

## 2018-03-02 NOTE — IP AVS SNAPSHOT
Post Anesthesia Care Unit [address] [address]    Phone:  [phone]                                       After Visit Summary   3/2/2018    Eduarda Lazar    MRN: [id number]           After Visit Summary Signature Page     I have received my discharge instructions, and my questions have been answered. I have discussed any challenges I see with this plan with the nurse or doctor.    ..........................................................................................................................................  Patient/Patient Representative Signature      ..........................................................................................................................................  Patient Representative Print Name and Relationship to Patient    ..................................................               ................................................  Date                                            Time    ..........................................................................................................................................  Reviewed by Signature/Title    ...................................................              ..............................................  Date                                                            Time

## 2018-03-02 NOTE — IP AVS SNAPSHOT
MRN:[id number]                      After Visit Summary   3/2/2018    Eduarda Lazar    MRN: [id number]           Thank you!     Thank you for choosing [facility] for your care. Our goal is always to provide you with excellent care. Hearing back from our patients is one way we can continue to improve our services. Please take a few minutes to complete the written survey that you may receive in the mail after you visit with us. Thank you!        Patient Information     Date Of Birth          1940        About your hospital stay     You were admitted on:  March 2, 2018 You last received care in the:  Post Anesthesia Care Unit [facility]    You were discharged on:  March 2, 2018       Who to Call     For medical emergencies, please call [phone].  For non-urgent questions about your medical care, please call your primary care provider or clinic, [phone]  For questions related to your surgery, please call your surgery clinic        Attending Provider     Provider Francisco Bermudez MD Neurosurgery       Primary Care Provider Office Phone # Fax #    Reyna Rivas [phone]      After Care Instructions     Wound care       You may shower post-operative day 1. After your incision gets wet, pat your incision dry. Do not vigorously rub your incision. - Do not apply any creams, gels, lotions, or ointments to your incision. - Do not submerge your incision in water for 4-6 weeks post-operatively. - Monitor your incision for signs of infection including redness, swelling, drainage or warmth at the surgical site.                  Follow-up Appointments     Follow Up ([facility])       Please schedule follow-up appointment in 2 weeks for wound check (Dr. Wheat)    Appointments on [place] and/or [facility] (with [facility] or [facility] provider or service). Call [phone] if you haven't heard regarding these appointments within 7 days of discharge.                   Your next 10 appointments already scheduled     May 15, 2018 12:25 PM CDT   (Arrive by 12:10 PM)   Return Movement Disorder with MIRZA Pat[facility] Neurology ([facility])    [address] [place] [address]  3rd [facility] [id number] [phone]              Further instructions from your care team       [facility] [place]  Same-Day Surgery   Adult Discharge Orders & Instructions     For 24 hours after surgery    1. Get plenty of rest.  A responsible adult must stay with you for at least 24 hours after you leave the hospital.   2. Do not drive or use heavy equipment.  If you have weakness or tingling, don't drive or use heavy equipment until this feeling goes away.  3. Do not drink alcohol.  4. Avoid strenuous or risky activities.  Ask for help when climbing stairs.   5. You may feel lightheaded.  IF so, sit for a few minutes before standing.  Have someone help you get up.   6. If you have nausea (feel sick to your stomach): Drink only clear liquids such as apple juice, ginger ale, broth or 7-Up.  Rest may also help.  Be sure to drink enough fluids.  Move to a regular diet as you feel able.  7. You may have a slight fever. Call the doctor if your fever is over 100 F (37.7 C) (taken under the tongue) or lasts longer than 24 hours.  8. You may have a dry mouth, a sore throat, muscle aches or trouble sleeping.  These should go away after 24 hours.  9. Do not make important or legal decisions.   Call your doctor for any of the followin.  Signs of infection (fever, growing tenderness at the surgery site, a large amount of drainage or bleeding, severe pain, foul-smelling drainage, redness, swelling).    2. It has been over 8 to 10 hours since surgery and you are still not able to urinate (pass water).    3.  Headache for over 24 hours.           To contact a doctor, call Dr. Wheat's office at [phone]-- Neurosurgery or:    x    "[phone] and ask for the resident on call for \NeuroSurgery (answered 24 hours a day)  x   Emergency Department:    [facility] [place]: [phone]       (TTY for hearing impaired: [phone])          Pending Results     Date and Time Order Name Status Description    3/2/2018 0942 Surgical pathology exam In process     3/2/2018 0647 EKG 12-lead, tracing only Preliminary             Admission Information     Date & Time Provider Department Dept. Phone    3/2/2018 Francisco Wheat MD Post Anesthesia Care Unit [facility] [phone]      Your Vitals Were     Blood Pressure Temperature Respirations Height Weight Pulse Oximetry    135/72 97.5  F (36.4  C) (Oral) 18 1.702 m (5' 7\") 83.3 kg (183 lb 10.3 oz) 97%    BMI (Body Mass Index)                   28.76 kg/m2           MyChart Information     [employer] gives you secure access to your electronic health record. If you see a primary care provider, you can also send messages to your care team and make appointments. If you have questions, please call your primary care clinic.  If you do not have a primary care provider, please call [phone] and they will assist you.        Care EveryWhere ID     This is your Care EveryWhere ID. This could be used by other organizations to access your [place] medical records  [id number]        Equal Access to Services     MANUEL HADLEY : Hadii aad ku hadasho Soshilaali, waaxda luqadaha, qaybta kaalmada raymon, mishel fritz. So [facility] [phone].    ATENCIÓN: Si habla español, tiene a art disposición servicios gratuitos de asistencia lingüística. Llame al [phone].    We comply with applicable federal civil rights laws and [place] laws. We do not discriminate on the basis of race, color, national origin, age, disability, sex, sexual orientation, or gender identity.               Review of your medicines      UNREVIEWED medicines. Ask your doctor about these medicines        Dose / " Directions    * carbidopa-levodopa  MG per CR tablet   Commonly known as:  SINEMET CR   Used for:  Parkinson disease (H)        Dose:  1 tablet   Take 1 tablet by mouth every 6 hours At 4 am, 10 am, 3 pm, and 10 pm   Quantity:  360 tablet   Refills:  3       * carbidopa-levodopa  MG per tablet   Commonly known as:  SINEMET   Used for:  Parkinson's disease (H)        Take 1/2 tab at 4 am, 1 tablet at 10 am, & 3 pm, and 1/2 tab 10 pm.   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 Read the directions carefully, and ask your doctor or other care provider to review them with you.      START taking        Dose / Directions    cephALEXin 500 MG capsule   Commonly known as:  KEFLEX   Used for:  End of battery life of deep brain stimulator        Dose:  500 mg   Take 1 capsule (500 mg) by mouth 2 times daily   Quantity:  14 capsule   Refills:  0       traMADol 50 MG tablet   Commonly known as:  ULTRAM   Used for:  End of battery life of deep brain stimulator        Dose:  50 mg   Take 1 tablet (50 mg) by mouth every 6 hours as needed for pain   Quantity:  20 tablet   Refills:  0         CONTINUE these medicines which have NOT CHANGED        Dose / Directions    acetaminophen 325 MG tablet   Commonly known as:  TYLENOL        Dose:  325 mg   Take 1 tablet (325 mg) by mouth every 4 hours as needed for pain   Quantity:  100  tablet   Refills:  0       Lutein 20 MG Tabs        Dose:  20 mg   Take 20 mg by mouth daily   Quantity:  90 tablet   Refills:  3       REMERON 15 MG tablet   Generic drug:  mirtazapine        Dose:  7.5 mg   Take 0.5 tablets (7.5 mg) by mouth At Bedtime   Quantity:  30 tablet   Refills:  0            Where to get your medicines      Some of these will need a paper prescription and others can be bought over the counter. Ask your nurse if you have questions.     Bring a paper prescription for each of these medications     cephALEXin 500 MG capsule    traMADol 50 MG tablet                Protect others around you: Learn how to safely use, store and throw away your medicines at www.disposemymeds.org.        ANTIBIOTIC INSTRUCTION     You've Been Prescribed an Antibiotic - Now What?  Your healthcare team thinks that you or your loved one might have an infection. Some infections can be treated with antibiotics, which are powerful, life-saving drugs. Like all medications, antibiotics have side effects and should only be used when necessary. There are some important things you should know about your antibiotic treatment.      Your healthcare team may run tests before you start taking an antibiotic.    Your team may take samples (e.g., from your blood, urine or other areas) to run tests to look for bacteria. These test can be important to determine if you need an antibiotic at all and, if you do, which antibiotic will work best.      Within a few days, your healthcare team might change or even stop your antibiotic.    Your team may start you on an antibiotic while they are working to find out what is making you sick.    Your team might change your antibiotic because test results show that a different antibiotic would be better to treat your infection.    In some cases, once your team has more information, they learn that you do not need an antibiotic at all. They may find out that you don't have an infection, or that the  antibiotic you're taking won't work against your infection. For example, an infection caused by a virus can't be treated with antibiotics. Staying on an antibiotic when you don't need it is more likely to be harmful than helpful.      You may experience side effects from your antibiotic.    Like all medications, antibiotics have side effects. Some of these can be serious.    Let you healthcare team know if you have any known allergies when you are admitted to the hospital.    One significant side effect of nearly all antibiotics is the risk of severe and sometimes deadly diarrhea caused by Clostridium difficile (C. Difficile). This occurs when a person takes antibiotics because some good germs are destroyed. Antibiotic use allows C. diificile to take over, putting patients at high risk for this serious infection.    As a patient or caregiver, it is important to understand your or your loved one's antibiotic treatment. It is especially important for caregivers to speak up when patients can't speak for themselves. Here are some important questions to ask your healthcare team.    What infection is this antibiotic treating and how do you know I have that infection?    What side effects might occur from this antibiotic?    How long will I need to take this antibiotic?    Is it safe to take this antibiotic with other medications or supplements (e.g., vitamins) that I am taking?     Are there any special directions I need to know about taking this antibiotic? For example, should I take it with food?    How will I be monitored to know whether my infection is responding to the antibiotic?    What tests may help to make sure the right antibiotic is prescribed for me?      Information provided by:  www.cdc.gov/getsmart  U.S. Department of Health and Human Services  Centers for disease Control and Prevention  National Center for Emerging and Zoonotic Infectious Diseases  Division of Healthcare Quality Promotion         Information about OPIOIDS     PRESCRIPTION OPIOIDS: WHAT YOU NEED TO KNOW    Prescription opioids can be used to help relieve moderate to severe pain and are often prescribed following a surgery or injury, or for certain health conditions. These medications can be an important part of treatment but also come with serious risks. It is important to work with your health care provider to make sure you are getting the safest, most effective care.    WHAT ARE THE RISKS AND SIDE EFFECTS OF OPIOID USE?  Prescription opioids carry serious risks of addiction and overdose, especially with prolonged use. An opioid overdose, often marked by slowed breathing can cause sudden death. The use of prescription opioids can have a number of side effects as well, even when taken as directed:      Tolerance - meaning you might need to take more of a medication for the same pain relief    Physical dependence - meaning you have symptoms of withdrawal when a medication is stopped    Increased sensitivity to pain    Constipation    Nausea, vomiting, and dry mouth    Sleepiness and dizziness    Confusion    Depression    Low levels of testosterone that can result in lower sex drive, energy, and strength    Itching and sweating    RISKS ARE GREATER WITH:    History of drug misuse, substance use disorder, or overdose    Mental health conditions (such as depression or anxiety)    Sleep apnea    Older age (65 years or older)    Pregnancy    Avoid alcohol while taking prescription opioids.   Also, unless specifically advised by your health care provider, medications to avoid include:    Benzodiazepines (such as Xanax or Valium)    Muscle relaxants (such as Soma or Flexeril)    Hypnotics (such as Ambien or Lunesta)    Other prescription opioids    KNOW YOUR OPTIONS:  Talk to your health care provider about ways to manage your pain that do not involve prescription opioids. Some of these options may actually work better and have fewer risks and  side effects:    Pain relievers such as acetaminophen, ibuprofen, and naproxen    Some medications that are also used for depression or seizures    Physical therapy and exercise    Cognitive behavioral therapy, a psychological, goal-directed approach, in which patients learn how to modify physical, behavioral, and emotional triggers of pain and stress    IF YOU ARE PRESCRIBED OPIOIDS FOR PAIN:    Never take opioids in greater amounts or more often than prescribed    Follow up with your primary health care provider and work together to create a plan on how to manage your pain.    Talk about ways to help manage your pain that do not involve prescription opioids    Talk about all concerns and side effects    Help prevent misuse and abuse    Never sell or share prescription opioids    Never use another person's prescription opioids    Store prescription opioids in a secure place and out of reach of others (this may include visitors, children, friends, and family)    Visit www.cdc.gov/drugoverdose to learn about risks of opioid abuse and overdose    If you believe you may be struggling with addiction, tell your health care provider and ask for guidance or call [facility]'s National Helpline at 3-284-488-HELP    LEARN MORE / www.cdc.gov/drugoverdose/prescribing/guideline.html    Safely dispose of unused prescription opioids: Find your local drug take-back programs and more information about the importance of safe disposal at www.doseofreality.mn.gov             Medication List: This is a list of all your medications and when to take them. Check marks below indicate your daily home schedule. Keep this list as a reference.      Medications           Morning Afternoon Evening Bedtime As Needed    acetaminophen 325 MG tablet   Commonly known as:  TYLENOL   Take 1 tablet (325 mg) by mouth every 4 hours as needed for pain   Last time this was given:  975 mg on 3/2/2018  8:42 AM                                * carbidopa-levodopa   MG per CR tablet   Commonly known as:  SINEMET CR   Take 1 tablet by mouth every 6 hours At 4 am, 10 am, 3 pm, and 10 pm                                * carbidopa-levodopa  MG per tablet   Commonly known as:  SINEMET   Take 1/2 tab at 4 am, 1 tablet at 10 am, & 3 pm, and 1/2 tab 10 pm.                                cephALEXin 500 MG capsule   Commonly known as:  KEFLEX   Take 1 capsule (500 mg) by mouth 2 times daily                                Lutein 20 MG Tabs   Take 20 mg by mouth daily                                REMERON 15 MG tablet   Take 0.5 tablets (7.5 mg) by mouth At Bedtime   Generic drug:  mirtazapine                                rivastigmine 1.5 MG capsule   Commonly known as:  EXELON   1.5 mg 2/DAY X 2 WKS; THEN 3 MG 2/DAY X 2 WKS THEN 4.5MG 2/DAY x 2wks THEN 6MG 2/DAY                                rivastigmine 4.6 MG/24HR 24 hr patch   Commonly known as:  EXELON   Place 1 patch onto the skin daily                                traMADol 50 MG tablet   Commonly known as:  ULTRAM   Take 1 tablet (50 mg) by mouth every 6 hours as needed for pain                                * Notice:  This list has 2 medication(s) that are the same as other medications prescribed for you. Read the directions carefully, and ask your doctor or other care provider to review them with you.

## 2018-03-05 ENCOUNTER — CARE COORDINATION (OUTPATIENT)
Dept: NEUROSURGERY | Facility: CLINIC | Age: 78
End: 2018-03-05

## 2018-03-05 NOTE — PROGRESS NOTES
Left VM to check on pt's status s/p DBS battery replacement surgery on 3/2/18. Left direct number and requested pt call at earliest convenience. Will f/u if I do not hear back.

## 2018-03-06 NOTE — TELEPHONE ENCOUNTER
Faxed a letter to [facility] (see letters within this encounter) regarding switch from Exelon patch to capsules at [phone].    Spoke with Faith at [facility] and they received this letter and are in understanding of the plan.    July Quan, Pharm.D.  Medication Therapy Management Resident  Phone: [phone]  Pager: [phone]

## 2018-03-07 ENCOUNTER — TELEPHONE (OUTPATIENT)
Dept: NEUROLOGY | Facility: CLINIC | Age: 78
End: 2018-03-07

## 2018-03-07 NOTE — TELEPHONE ENCOUNTER
rivastigmine (EXELON) 1.5 MG capsule 120 capsule 11 3/2/2018  No   Si.5 mg 2/DAY X 2 WKS; THEN 3 MG 2/DAY X 2 WKS THEN 4.5MG 2/DAY x 2wks THEN 6MG 2/DAY     I see that July Quan has been working on this. Will defer to her.

## 2018-03-07 NOTE — TELEPHONE ENCOUNTER
----- Message from Beatrice Moy RN sent at 3/6/2018  1:23 PM CST -----  Regarding: FW: Rx clarification      ----- Message -----     From: Cassie Keys     Sent: 3/6/2018   1:12 PM       To: [facility]uro[facility]  Subject: Rx clarification                                 Pt Marie gonzalez, called regarding Rx rivastigmine (EXELON) 1.5 MG capsule. They need to clarify the instructions. They are requesting you call them back at [phone] or refax the Rx without a stop date.     Thank you,    Cassie    Please DO NOT send this message and/or reply back to sender. Call Center Representatives DO NOT respond to messages.

## 2018-03-07 NOTE — TELEPHONE ENCOUNTER
Spoke with Faith at [facility] yesterday at 2 pm. They are in understanding of the plan with Exelon capsules.     Also talked with Marie, at [phone], and confirmed dosing is clear. There will not be a stop date on the 6 mg capsules, it will continue at this dose when patient is titrated up to that dose.    July Quan, Pharm.D.  Medication Therapy Management Resident  Phone: [phone]  Pager: [phone]

## 2018-03-19 ENCOUNTER — OFFICE VISIT (OUTPATIENT)
Dept: NEUROSURGERY | Facility: CLINIC | Age: 78
End: 2018-03-19
Payer: MEDICARE

## 2018-03-19 VITALS
DIASTOLIC BLOOD PRESSURE: 60 MMHG | BODY MASS INDEX: 28.97 KG/M2 | SYSTOLIC BLOOD PRESSURE: 105 MMHG | HEART RATE: 64 BPM | WEIGHT: 184.6 LBS | HEIGHT: 67 IN

## 2018-03-19 DIAGNOSIS — G20.A1 PARKINSON'S DISEASE (H): ICD-10-CM

## 2018-03-19 DIAGNOSIS — Z96.89 S/P DEEP BRAIN STIMULATOR PLACEMENT: ICD-10-CM

## 2018-03-19 DIAGNOSIS — Z45.42 END OF BATTERY LIFE OF DEEP BRAIN STIMULATOR: Primary | ICD-10-CM

## 2018-03-19 NOTE — LETTER
3/19/2018       RE: Eduarda Lazar  [address] [place] [id number]     Dear Colleague,    Thank you for referring your patient, Eduarda Lazar, to the [facility] NEUROSURGERY at [facility]. Please see a copy of my visit note below.    HISTORY AND PHYSICAL EXAM    Chief Complaint   Patient presents with     RECHECK     Parkinson's disease s/p left side DBS generator/battery change 03/02/2018       HISTORY OF PRESENT ILLNESS  We saw Ms. Eduarda Lazar back in Neurosurgery Clinic for her post-operative wound check.  She underwent left-sided DBS generator/battery replacement on 03/02/2018.  This procedure was uncomplicated.  She has not noticed a change in her symptoms, specifically they have not worsened.  She has had no issues with wound/incision healing.  She denies any fevers, chills, nausea, vomiting, dizziness, weakness, numbness or seizure like activity.    To review, she underwent deep brain stimulator electrode implantation of the bilateral subthalamic nuclei, left side in 07/2009 and right side in 01/2010. Her left battery was replaced in 2013 and her right side was replaced in 11/2015.       Past Medical History:   Diagnosis Date     Anxiety      Asthma 01/12/2005    Does not have an inhaler, and has not needed one for some time.       Cervical spine arthritis (H)      Depression      H/O Atrioventricular kennedy re-entry tachycardia (H) 10/01/2007    Treated with ablation.       Murmur, cardiac 11/11/2015     Obesity      Osteoarthritis of lumbar spine      Parkinson's disease (H)      Parkinson's disease dementia (H) neuropsych 2017 10/13/2017    IMPRESSIONS AND RECOMMENDATIONS   Current results indicate moderate dementia, characterized by significant impairments in learning and retrieval of learned information, executive dysfunction including impairments in the ability to establish and maintain cognitive set, and impairments in complex  attentional processing, information and psychomotor processing speed, and visual processing. Language ab     S/P deep brain stimulator placement      Sleep apnea     Does not use CPAP.       Past Surgical History:   Procedure Laterality Date     APPENDECTOMY OPEN       BRAIN SURGERY Right 12 Jan 2010    right STN DBS lead     DILATION AND CURETTAGE      x 3     HYSTERECTOMY       IMPLANT PULSE GENERATOR SUBCUTANEOUS Right 17 Feb 2010    ipg soletra right side     IMPLANT PULSE GENERATOR SUBCUTANEOUS Left 12 aug 2009    left ipg     REPAIR BLADDER       REPLACE DEEP BRAIN STIMULATION GENERATOR / BATTERY Right 11/13/2015    Procedure: REPLACE DEEP BRAIN STIMULATION GENERATOR / BATTERY;  Surgeon: Francisco Wheat MD;  Location: [facility] OR     REPLACE DEEP BRAIN STIMULATION GENERATOR / BATTERY Left 3/2/2018    Procedure: REPLACE DEEP BRAIN STIMULATION GENERATOR / BATTERY;  Replacement Of Deep Brain Stimulator Generator/Battery, Model Activa SC, Over The Left Chest Wall;  Surgeon: Francisco Wheat MD;  Location: [facility] OR     REPLACE PULSE GENERATOR BATTERY SUBCUTANEOUS  10/1/2013    Procedure: REPLACE PULSE GENERATOR BATTERY SUBCUTANEOUS;  Left Deep Brain Stimulator Battery Replacement;  Surgeon: Rodney Fajardo MD;  Location: [facility] OR     SEPTOPLASTY       STEREOTACTIC INSERTION DEEP BRAIN STIMULATION Left 22 jul 2009    left dbs lead     TONSILLECTOMY         Family History   Problem Relation Age of Onset     DIABETES Mother      CEREBROVASCULAR DISEASE Mother      HEART DISEASE Mother      Hypertension Father      CEREBROVASCULAR DISEASE Father      Coronary Artery Disease Father      DIABETES Sister        Social History     Social History     Marital status:      Spouse name: N/A     Number of children: N/A     Years of education: N/A     Occupational History     Not on file.     Social History Main Topics     Smoking status: Never Smoker     Smokeless tobacco: Never Used     Alcohol use No      Drug use: No     Sexual activity: Not on file     Other Topics Concern     Not on file     Social History Narrative    . Has three children. Lives alone in an apartment building.           Allergies   Allergen Reactions     Barbiturates      Camphor Swelling and Other (See Comments)     Other reaction(s): Redness     Comtan      Elevated temperature, feeling worn out, dizzy and worse.  Dr. Agrawal had Rxd this.      Exelon [Rivastigmine] Itching     WITH PATCH ONLY     Oxycodone      Sick to the stomach and feeling terrible all over     Vancomycin      Clindamycin Hcl Rash     Sulfa Drugs Rash       Current Outpatient Prescriptions   Medication     traMADol (ULTRAM) 50 MG tablet     cephALEXin (KEFLEX) 500 MG capsule     rivastigmine (EXELON) 1.5 MG capsule     carbidopa-levodopa (SINEMET)  MG per tablet     carbidopa-levodopa (SINEMET CR)  MG per CR tablet     acetaminophen (TYLENOL) 325 MG tablet     mirtazapine (REMERON) 15 MG tablet     Lutein 20 MG TABS     No current facility-administered medications for this visit.          REVIEW OF SYSTEMS - form completed by the patient's son on 02/05/2018. Updated. Per HPI.  General: negative for difficulty sleeping and headaches, chills/sweats/fever, fatigue, weight gain or loss.  Skin: negative for color changes of the hands or feet in the cold, chronic skin itching, poor scarring/non-healing ulcer, skin rashes or hives, or unusual moles.   Eyes: negative for blurred vision, crossed eyes, double vision, eye infection or vision flashes or halos  Ears/Nose/Throat: negative for bleeding gums, difficulty swallowing, negative for earache, ear discharge or hearing loss.  Respiratory: negative for asthma, chronic cough, coughing up blood, night sweats tuberculosis, wheezing.  Cardiovascular: negative for lower extremity swelling, chest pain, difficulty breathing at night, irregular heart beat, pacemaker, varicose vein.  Gastrointestinal: negative for  "heartburn, black stools, blood in stool, chronic diarrhea, Hepatitis A, B, C, constipation, liver disease, nausea, vomiting.  Genitourinary: negative for difficulty with urination, discharges, urgency, urinary tract infection.  Musculoskeletal: negative for arthritis, joint swelling, muscle tenderness, osteoporosis.  Neurologic: negative for headaches, numbness of arms or legs, problem with memory, tingling of hands, arms or legs.  Psychiatric: negative for anxiety, depression, panic attacks, restlessness  Hematologic/Lymphatic/Immunologic: negative for easy skin bruising, marked fatigue, prolonged bleeding from cuts or tooth extractions, tender glands/lymph nodes.  Endocrine: negative for excessive hunger/thirst, intolerance to warm room, loss of libido, multiple broken bones, rapid weight gain/loss, thyroid problems, spontaneous nipple discharge.  Allergic: negative for hay fever or asthma.      PHYSICAL EXAM  /60  Pulse 64  Ht 1.702 m (5' 7\")  Wt 83.7 kg (184 lb 9.6 oz)  BMI 28.91 kg/m2    General: Awake, alert, oriented. Well nourished, well developed, she is not in any acute distress.  Incisions: Left chest wall incision healing well. There is no redness, fluid collection or drainage.    Neurological:  Awake, alert and oriented to date, time, place and person. Speech fluent.   Pupils equal, round, reactive to light.  Extraocular movement intact.  Facial sensation intact.  Face symmetric.    Motor: moves all extremities well.  Sensation: grossly intact.      ASSESSMENT  77 year old female with a history of Parkinson's disease  s/p left-sided DBS lead implantation 07/2009; battery replacement 10/2013 and 03/2018  s/p right-sided DBS lead implantation 01/2010; last battery replacement 11/2015  Bilateral Activa SC generators/batteries for DBS.    She is doing well from her recent left side DBS generator/battery replacement performed on 3/2/2018.  Her incision is healing well.  Patient did not have any " questions.    Patient's son asked if we would test the efficacy of the DBS system by turning it off to see any changes.  I told him that we went ahead with the generator/battery replacement giving her the benefit of doubt that the DBS system was helping her.  Therefore, we do not need to do this for now.  He expressed understanding.      PLAN  1. No future appointments needed at this time - we will see her back as needed.    I, Neri Tesfaye, am serving as a scribe to document services personally performed by Francisco Wheat MD, PhD, based upon my observations and the provider's statements to me. All documentation has been reviewed and edited by the aforementioned doctor prior to being entered into the official medical record.    I, Francisco Wheat, attest that above named individual is acting in scribe capacity, has observed my performance of the services and has documented them in accordance with my direction. The documentation recorded by the scribe accurately reflects the service I personally performed and the decisions made by me. The document was also partially recorded by me and the entire document was edited by me as well.     Again, thank you for allowing me to participate in the care of your patient.      Sincerely,    Francisco Wheat MD

## 2018-03-19 NOTE — PROGRESS NOTES
HISTORY AND PHYSICAL EXAM    Chief Complaint   Patient presents with     RECHECK     Parkinson's disease s/p left side DBS generator/battery change 03/02/2018       HISTORY OF PRESENT ILLNESS  We saw Ms. Eduarda Lazar back in Neurosurgery Clinic for her post-operative wound check.  She underwent left-sided DBS generator/battery replacement on 03/02/2018.  This procedure was uncomplicated.  She has not noticed a change in her symptoms, specifically they have not worsened.  She has had no issues with wound/incision healing.  She denies any fevers, chills, nausea, vomiting, dizziness, weakness, numbness or seizure like activity.    To review, she underwent deep brain stimulator electrode implantation of the bilateral subthalamic nuclei, left side in 07/2009 and right side in 01/2010. Her left battery was replaced in 2013 and her right side was replaced in 11/2015.       Past Medical History:   Diagnosis Date     Anxiety      Asthma 01/12/2005    Does not have an inhaler, and has not needed one for some time.       Cervical spine arthritis (H)      Depression      H/O Atrioventricular kennedy re-entry tachycardia (H) 10/01/2007    Treated with ablation.       Murmur, cardiac 11/11/2015     Obesity      Osteoarthritis of lumbar spine      Parkinson's disease (H)      Parkinson's disease dementia (H) neuropsych 2017 10/13/2017    IMPRESSIONS AND RECOMMENDATIONS   Current results indicate moderate dementia, characterized by significant impairments in learning and retrieval of learned information, executive dysfunction including impairments in the ability to establish and maintain cognitive set, and impairments in complex attentional processing, information and psychomotor processing speed, and visual processing. Language ab     S/P deep brain stimulator placement      Sleep apnea     Does not use CPAP.       Past Surgical History:   Procedure Laterality Date     APPENDECTOMY OPEN       BRAIN SURGERY Right 12 Jan 2010     right STN DBS lead     DILATION AND CURETTAGE      x 3     HYSTERECTOMY       IMPLANT PULSE GENERATOR SUBCUTANEOUS Right 17 Feb 2010    ipg soletra right side     IMPLANT PULSE GENERATOR SUBCUTANEOUS Left 12 aug 2009    left ipg     REPAIR BLADDER       REPLACE DEEP BRAIN STIMULATION GENERATOR / BATTERY Right 11/13/2015    Procedure: REPLACE DEEP BRAIN STIMULATION GENERATOR / BATTERY;  Surgeon: Francisco Wheat MD;  Location: [facility] OR     REPLACE DEEP BRAIN STIMULATION GENERATOR / BATTERY Left 3/2/2018    Procedure: REPLACE DEEP BRAIN STIMULATION GENERATOR / BATTERY;  Replacement Of Deep Brain Stimulator Generator/Battery, Model Activa SC, Over The Left Chest Wall;  Surgeon: Francisco Wheat MD;  Location: [facility] OR     REPLACE PULSE GENERATOR BATTERY SUBCUTANEOUS  10/1/2013    Procedure: REPLACE PULSE GENERATOR BATTERY SUBCUTANEOUS;  Left Deep Brain Stimulator Battery Replacement;  Surgeon: Rodney Fajardo MD;  Location: [facility] OR     SEPTOPLASTY       STEREOTACTIC INSERTION DEEP BRAIN STIMULATION Left 22 jul 2009    left dbs lead     TONSILLECTOMY         Family History   Problem Relation Age of Onset     DIABETES Mother      CEREBROVASCULAR DISEASE Mother      HEART DISEASE Mother      Hypertension Father      CEREBROVASCULAR DISEASE Father      Coronary Artery Disease Father      DIABETES Sister        Social History     Social History     Marital status:      Spouse name: N/A     Number of children: N/A     Years of education: N/A     Occupational History     Not on file.     Social History Main Topics     Smoking status: Never Smoker     Smokeless tobacco: Never Used     Alcohol use No     Drug use: No     Sexual activity: Not on file     Other Topics Concern     Not on file     Social History Narrative    . Has three children. Lives alone in an apartment building.           Allergies   Allergen Reactions     Barbiturates      Camphor Swelling and Other (See Comments)     Other  reaction(s): Redness     Comtan      Elevated temperature, feeling worn out, dizzy and worse.  Dr. Agrawal had Rxd this.      Exelon [Rivastigmine] Itching     WITH PATCH ONLY     Oxycodone      Sick to the stomach and feeling terrible all over     Vancomycin      Clindamycin Hcl Rash     Sulfa Drugs Rash       Current Outpatient Prescriptions   Medication     traMADol (ULTRAM) 50 MG tablet     cephALEXin (KEFLEX) 500 MG capsule     rivastigmine (EXELON) 1.5 MG capsule     carbidopa-levodopa (SINEMET)  MG per tablet     carbidopa-levodopa (SINEMET CR)  MG per CR tablet     acetaminophen (TYLENOL) 325 MG tablet     mirtazapine (REMERON) 15 MG tablet     Lutein 20 MG TABS     No current facility-administered medications for this visit.          REVIEW OF SYSTEMS - form completed by the patient's son on 02/05/2018. Updated. Per HPI.  General: negative for difficulty sleeping and headaches, chills/sweats/fever, fatigue, weight gain or loss.  Skin: negative for color changes of the hands or feet in the cold, chronic skin itching, poor scarring/non-healing ulcer, skin rashes or hives, or unusual moles.   Eyes: negative for blurred vision, crossed eyes, double vision, eye infection or vision flashes or halos  Ears/Nose/Throat: negative for bleeding gums, difficulty swallowing, negative for earache, ear discharge or hearing loss.  Respiratory: negative for asthma, chronic cough, coughing up blood, night sweats tuberculosis, wheezing.  Cardiovascular: negative for lower extremity swelling, chest pain, difficulty breathing at night, irregular heart beat, pacemaker, varicose vein.  Gastrointestinal: negative for heartburn, black stools, blood in stool, chronic diarrhea, Hepatitis A, B, C, constipation, liver disease, nausea, vomiting.  Genitourinary: negative for difficulty with urination, discharges, urgency, urinary tract infection.  Musculoskeletal: negative for arthritis, joint swelling, muscle tenderness,  "osteoporosis.  Neurologic: negative for headaches, numbness of arms or legs, problem with memory, tingling of hands, arms or legs.  Psychiatric: negative for anxiety, depression, panic attacks, restlessness  Hematologic/Lymphatic/Immunologic: negative for easy skin bruising, marked fatigue, prolonged bleeding from cuts or tooth extractions, tender glands/lymph nodes.  Endocrine: negative for excessive hunger/thirst, intolerance to warm room, loss of libido, multiple broken bones, rapid weight gain/loss, thyroid problems, spontaneous nipple discharge.  Allergic: negative for hay fever or asthma.      PHYSICAL EXAM  /60  Pulse 64  Ht 1.702 m (5' 7\")  Wt 83.7 kg (184 lb 9.6 oz)  BMI 28.91 kg/m2    General: Awake, alert, oriented. Well nourished, well developed, she is not in any acute distress.  Incisions: Left chest wall incision healing well. There is no redness, fluid collection or drainage.    Neurological:  Awake, alert and oriented to date, time, place and person. Speech fluent.   Pupils equal, round, reactive to light.  Extraocular movement intact.  Facial sensation intact.  Face symmetric.    Motor: moves all extremities well.  Sensation: grossly intact.      ASSESSMENT  77 year old female with a history of Parkinson's disease  s/p left-sided DBS lead implantation 07/2009; battery replacement 10/2013 and 03/2018  s/p right-sided DBS lead implantation 01/2010; last battery replacement 11/2015  Bilateral Activa SC generators/batteries for DBS.    She is doing well from her recent left side DBS generator/battery replacement performed on 3/2/2018.  Her incision is healing well.  Patient did not have any questions.    Patient's son asked if we would test the efficacy of the DBS system by turning it off to see any changes.  I told him that we went ahead with the generator/battery replacement giving her the benefit of doubt that the DBS system was helping her.  Therefore, we do not need to do this for now.  " He expressed understanding.      PLAN  1. No future appointments needed at this time - we will see her back as needed.    I, Neri Tesfaye, am serving as a scribe to document services personally performed by Francisco Wheat MD, PhD, based upon my observations and the provider's statements to me. All documentation has been reviewed and edited by the aforementioned doctor prior to being entered into the official medical record.    I, Francisco Wheat, attest that above named individual is acting in scribe capacity, has observed my performance of the services and has documented them in accordance with my direction. The documentation recorded by the scribe accurately reflects the service I personally performed and the decisions made by me. The document was also partially recorded by me and the entire document was edited by me as well.

## 2018-03-19 NOTE — MR AVS SNAPSHOT
After Visit Summary   3/19/2018    Eduarda Lazar    MRN: [id number]           Patient Information     Date Of Birth          1940        Visit Information        Provider Department      3/19/2018 11:00 AM Francisco Wheat MD [facility] Neurosurgery        Today's Diagnoses     End of battery life of deep brain stimulator    -  1    S/P deep brain stimulator placement        Parkinson's disease (H)           Follow-ups after your visit        Your next 10 appointments already scheduled     May 15, 2018 12:25 PM CDT   (Arrive by 12:10 PM)   Return Movement Disorder with MIRZA Patel CNP   [facility] Neurology ([facility] [place])    [address] [address] [phone]              Who to contact     Please call your clinic at [phone] to:    Ask questions about your health    Make or cancel appointments    Discuss your medicines    Learn about your test results    Speak to your doctor            Additional Information About Your Visit        [employer] Information     [employer] gives you secure access to your electronic health record. If you see a primary care provider, you can also send messages to your care team and make appointments. If you have questions, please call your primary care clinic.  If you do not have a primary care provider, please call [phone] and they will assist you.      [employer] is an electronic gateway that provides easy, online access to your medical records. With [employer], you can request a clinic appointment, read your test results, renew a prescription or communicate with your care team.     To access your existing account, please contact your [facility] or call [phone] for assistance.        Care EveryWhere ID     This is your Care EveryWhere ID. This could be used by other organizations to access your [place] medical records  [id number]        Your Vitals  "Were     Pulse Height BMI (Body Mass Index)             64 1.702 m (5' 7\") 28.91 kg/m2          Blood Pressure from Last 3 Encounters:   03/19/18 105/60   03/02/18 114/46   02/07/18 152/78    Weight from Last 3 Encounters:   03/19/18 83.7 kg (184 lb 9.6 oz)   03/02/18 83.3 kg (183 lb 10.3 oz)   02/07/18 85.3 kg (188 lb)              Today, you had the following     No orders found for display       Primary Care Provider Office Phone # Fax #    Reyna Rivas [phone]       [facility] [address] [facility] [address]        Equal Access to Services     MANUEL HADLEY : Willem catherineo Soshilaali, waaxda luqadaha, qaybta kaalmada adeegyada, mishel fritz. [facility] [phone].    ATENCIÓN: Si habla español, tiene a art disposición servicios gratuitos de asistencia lingüística. [facility] [phone].    We comply with applicable federal civil rights laws and [place] laws. We do not discriminate on the basis of race, color, national origin, age, disability, sex, sexual orientation, or gender identity.            Thank you!     Thank you for choosing [facility]  for your care. Our goal is always to provide you with excellent care. Hearing back from our patients is one way we can continue to improve our services. Please take a few minutes to complete the written survey that you may receive in the mail after your visit with us. Thank you!             Your Updated Medication List - Protect others around you: Learn how to safely use, store and throw away your medicines at www.disposemymeds.org.          This list is accurate as of 3/19/18 11:59 PM.  Always use your most recent med list.                   Brand Name Dispense Instructions for use Diagnosis    acetaminophen 325 MG tablet    TYLENOL    100 tablet    Take 1 tablet (325 mg) by mouth every 4 hours as needed for pain        * carbidopa-levodopa  MG per CR tablet    SINEMET CR    " 360 tablet    Take 1 tablet by mouth every 6 hours At 4 am, 10 am, 3 pm, and 10 pm    Parkinson disease (H)       * carbidopa-levodopa  MG per tablet    SINEMET    270 tablet    Take 1/2 tab at 4 am, 1 tablet at 10 am, & 3 pm, and 1/2 tab 10 pm.    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 Read the directions carefully, and ask your doctor or other care provider to review them with you.

## 2018-05-15 ENCOUNTER — OFFICE VISIT (OUTPATIENT)
Dept: NEUROLOGY | Facility: CLINIC | Age: 78
End: 2018-05-15
Payer: MEDICARE

## 2018-05-15 VITALS
WEIGHT: 173.7 LBS | SYSTOLIC BLOOD PRESSURE: 101 MMHG | DIASTOLIC BLOOD PRESSURE: 56 MMHG | HEART RATE: 69 BPM | HEIGHT: 67 IN | BODY MASS INDEX: 27.26 KG/M2

## 2018-05-15 DIAGNOSIS — F02.80 DEMENTIA DUE TO PARKINSON'S DISEASE WITHOUT BEHAVIORAL DISTURBANCE (H): ICD-10-CM

## 2018-05-15 DIAGNOSIS — G20.A1 PARKINSON'S DISEASE (H): Primary | ICD-10-CM

## 2018-05-15 DIAGNOSIS — F41.9 ANXIETY: ICD-10-CM

## 2018-05-15 DIAGNOSIS — G20.A1 DEMENTIA DUE TO PARKINSON'S DISEASE WITHOUT BEHAVIORAL DISTURBANCE (H): ICD-10-CM

## 2018-05-15 ASSESSMENT — PAIN SCALES - GENERAL: PAINLEVEL: NO PAIN (0)

## 2018-05-15 NOTE — PROGRESS NOTES
"PATIENT: Eduarda Lazar    : 1940    ANDREE: May 15, 2018    REASON FOR VISIT: Parkinson's diease follow up & DBS interrogation & analysis.     HPI: Ms. Eduarda Lazar is a 78 year old  female who came to the [facility] neurology clinic accompanied by her son & 3 year-old granddaughter for a follow up visit.  She was last seen in the clinic on 2018 by Dr. Cardoza for a routine f/u visit.  During that visit, the following were discussed: -    Thus the major issues are   1. Cognition and the rivastigmine patch  2. Anxiety and remeron dose of 1/2 of 15mg at night.   3. Movement - sinemet dose and timing and dbs battery  4. UTI that will be treated    Return to see Margarita in a few months     Marcelo Cardoza MD    She continues to live in [place], [place].  Since she is not living at home, she not too happy.  \"But they treat me well.\"    Since her last visit, she had her DBS generator replaced on 3/2/2018.  Motor symptoms are stable.  She has shaking when she gets nervous.  She is getting PRN Sinemet, which is controlling her symptoms.  She is using a walker to walk.  No falls.    PD Medications 04 am 10 am 3 pm 10 pm PRN   Sinemet 25/100 mg  1/2 1 1 1/2 1/2 tab x 2   Sinemet 50/200 mg  1  1 1      She reports anxiety is controlled.  Memory continues to be a problem.  She is on Exelon 6 mg BID.    MEDICATIONS:   Medication Sig     acetaminophen (TYLENOL) 325 MG Take 1 tablet (325 mg) by mouth every 4 hours as needed for pain     carbidopa-levodopa (SINEMET CR)  MG per CR Take 1 tablet by mouth every 6 hours At 4 am, 10 am, 3 pm, and 10 pm     carbidopa-levodopa (SINEMET)  MG Take 1/2 tab at 4 am, 1 tablet at 10 am, & 3 pm, and 1/2 tab 10 pm.     cephALEXin (KEFLEX) 500 MG capsule Take 1 capsule (500 mg) by mouth 2 times daily     Lutein 20 MG TABS Take 20 mg by mouth daily     mirtazapine (REMERON) 15 MG Take 0.5 tablets (7.5 mg) by mouth At Bedtime     ondansetron " "(ZOFRAN) 4 MG Take 1 tablet (4 mg) by mouth every 6 hours as needed for nausea     ranitidine (ZANTAC) 75 MG Take 1 tablet (75 mg) by mouth At Bedtime     rivastigmine (EXELON) 1.5 MG capsule Take 6 mg 2 x a day.     traMADol (ULTRAM) 50 MG Take 1 tablet (50 mg) by mouth every 6 hours as needed for pain       ALLERGIES: Barbiturates; Camphor; Comtan; Exelon [rivastigmine]; Oxycodone; Vancomycin; Clindamycin hcl; and Sulfa drugs.      PHYSICAL EXAM:    VITAL SIGNS:  Blood pressure 101/56, pulse 69, height 1.702 m (5' 7\"), weight 78.8 kg (173 lb 11.2 oz).  Body mass index is 27.21 kg/(m^2).    GENERAL:  Ms. Lazar is a pleasant  female who is well-groomed and well-developed sitting comfortably in the exam room without any distress.  Affect is appropriate.    MOVEMENT DISORDERS ASSESSMENT:   Speech: Slight loss of expression and volume.  Facial Expression: Slight, abnormal diminution of facial expression.  Rest Tremor: Absent.   Dyskinesias:  Mild in Lt body; LUE > LLE.   Action/Postural Tremor: Absent.   Rigidity: Absent.   Finger Taps: Mild slowing and reduction in amplitude bilaterally.   Hand opening & closing: Mild slowing and reduction in amplitude bilaterally; Lt > Rt.   Hand pronation & supination: Moderately impaired; early fatiguing; occasional arrests in movement bilaterally.   Leg Agility: Moderately impaired; early fatiguing; occasional arrests in movement in Lt; dyskinesias interfering with tapping in Lt leg.   Body Bradykinesia: Mild degree of slowness and poverty of movement.     DBS Interrogation & Analysis:     Implanted generator:  Bilateral chest Activa-SC.  Lead placement:  Bilateral Subthalamic Nucleus (STN).     Left STN     Therapy On:  100%  Battery Service Life:  OK.  2.97 volts.     C +, 1 -  Voltage: 2.6 volts  PW: 120 msec  Rate: 160 Hz     Electrode Impedance Check: (Amplitude 3.0 volts: PW 80 msec: Rate 100 Hz)   Left Lead: No Problems Found.      Right STN  2 +, 3 -  Voltage: " 2.8 volts  PW: 90 msec  Rate: 185 Hz     Therapy On:  100%  Battery Service Life:  OK.  2.88 volts.     Electrode Impedance Check:  (Amplitude 3.0 volts: PW 80 msec: Rate 100 Hz)   Right Lead: No Problems Found.     See scanned report for impedance details.    ASSESSMENT:    #1)  Parkinson's Disease.  #2)  S/p bilateral STN DBS implantation.   #3)  Dementia.  #4) Anxiety.     PLAN:    #1)  Stay on the same antiparkinsonian medications.    #2)  DBS electrical system impedance is normal. The battery life is also good.    #3)  Stay on Rivastigmine for memory.    #4)  Continue on Remeron for anxiety.     Will return to our clinic in 4 - 6 months or sooner as needed.    The total amount of time spent with the patient was 35 minutes; 20 min in DBS interrogation & analysis and 15 min in addressing PD, anxiety, & Dementia.  50% of the time was spent in counseling & coordination of care.      MIRZA Donnelly,  CNP  [facility]

## 2018-05-15 NOTE — PATIENT INSTRUCTIONS
May 15, 2018    Dear Ms. Eduarda Squiresever,    Thank you for coming today.  During your visit, we have discussed the following:     __ Your DBS electrical system function (impedance) is normal. The battery life is also good.  __  Stay on the same antiparkinsonian medications.  __  Stay on Rivastigmine for memory.  __  Continue on Remeron for anxiety.   __ For DBS related questions, [employer] Support Line is [phone].  __  Return in 4 - 6 months to see Dr. Cardoza.     To contact our clinic, you may call [phone] or use [employer] message.          MIRZA Donnelly, CNP  [facility]

## 2018-05-15 NOTE — MR AVS SNAPSHOT
After Visit Summary   5/15/2018    Eduarda Lazar    MRN: [id number]           Patient Information     Date Of Birth          1940        Visit Information        Provider Department      5/15/2018 12:25 PM Clinton Zepeda APRN CNP [facility]        Care Instructions    May 15, 2018    Dear Ms. Eduarda Lazar,    Thank you for coming today.  During your visit, we have discussed the following:     __ Your DBS electrical system function (impedance) is normal. The battery life is also good.  __  Stay on the same antiparkinsonian medications.  __  Stay on Rivastigmine for memory.  __  Continue on Remeron for anxiety.   __ For DBS related questions, [employer] Support Line is [phone].  __  Return in 4 - 6 months to see Dr. Cardoza.     To contact our clinic, you may call [phone] or use [employer] message.          MIRZA Donnelly, CNP  [facility]              Follow-ups after your visit        Your next 10 appointments already scheduled     Nov 15, 2018 12:10 PM CST   (Arrive by 11:55 AM)   Return Movement Disorder with Marcelo Cardoza MD   [facility] ([facility])    [address] [place] [id number] [phone]              Who to contact     Please call your clinic at [phone] to:    Ask questions about your health    Make or cancel appointments    Discuss your medicines    Learn about your test results    Speak to your doctor            Additional Information About Your Visit        [employer] Information     [employer] gives you secure access to your electronic health record. If you see a primary care provider, you can also send messages to your care team and make appointments. If you have questions, please call your primary care clinic.  If you do not have a primary care provider, please call [phone] and they will assist you.      [employer] is an electronic gateway that provides easy,  "online access to your medical records. With [employer], you can request a clinic appointment, read your test results, renew a prescription or communicate with your care team.     To access your existing account, please contact your [facility] or call [phone] for assistance.        Care EveryWhere ID     This is your Care EveryWhere ID. This could be used by other organizations to access your [place] medical records  [id number]        Your Vitals Were     Pulse Height BMI (Body Mass Index)             69 1.702 m (5' 7\") 27.21 kg/m2          Blood Pressure from Last 3 Encounters:   05/15/18 101/56   03/19/18 105/60   03/02/18 114/46    Weight from Last 3 Encounters:   05/15/18 78.8 kg (173 lb 11.2 oz)   03/19/18 83.7 kg (184 lb 9.6 oz)   03/02/18 83.3 kg (183 lb 10.3 oz)              Today, you had the following     No orders found for display       Primary Care Provider Office Phone # Fax #    Reyna CARRANZA [facility] [phone]       [facility] [address] [facility] [address]        Equal Access to Services     MANUEL HADLEY AH: Hadii aad ku hadasho Soomaali, waaxda luqadaha, qaybta kaalmada adeegyada, mishel antonion alonzo monson laprudencio fritz. So [facility] [phone].    ATENCIÓN: Si habla español, tiene a art disposición servicios gratuitos de asistencia lingüística. [facility] [phone].    We comply with applicable federal civil rights laws and [place] laws. We do not discriminate on the basis of race, color, national origin, age, disability, sex, sexual orientation, or gender identity.            Thank you!     Thank you for choosing [facility]  for your care. Our goal is always to provide you with excellent care. Hearing back from our patients is one way we can continue to improve our services. Please take a few minutes to complete the written survey that you may receive in the mail after your visit with us. Thank you!             Your " Updated Medication List - Protect others around you: Learn how to safely use, store and throw away your medicines at www.disposemymeds.org.          This list is accurate as of 5/15/18  1:09 PM.  Always use your most recent med list.                   Brand Name Dispense Instructions for use Diagnosis    acetaminophen 325 MG tablet    TYLENOL    100 tablet    Take 1 tablet (325 mg) by mouth every 4 hours as needed for pain        * carbidopa-levodopa  MG per CR tablet    SINEMET CR    360 tablet    Take 1 tablet by mouth every 6 hours At 4 am, 10 am, 3 pm, and 10 pm    Parkinson disease (H)       * carbidopa-levodopa  MG per tablet    SINEMET    270 tablet    Take 1/2 tab at 4 am, 1 tablet at 10 am, & 3 pm, and 1/2 tab 10 pm.    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 Read the directions carefully, and ask your doctor or other care provider to review them with you.

## 2018-05-15 NOTE — NURSING NOTE
Chief Complaint   Patient presents with     RECHECK     [facility]- MOVEMENT DISORDER     Tatiana Funk MA

## 2018-05-16 RX ORDER — RIVASTIGMINE TARTRATE 1.5 MG/1
CAPSULE ORAL
Qty: 120 CAPSULE | Refills: 11 | COMMUNITY
Start: 2018-05-16 | End: 2018-06-15

## 2018-05-16 RX ORDER — ONDANSETRON 4 MG/1
4 TABLET, FILM COATED ORAL EVERY 8 HOURS
COMMUNITY
Start: 2018-05-16 | End: 2018-06-15

## 2018-06-15 ENCOUNTER — TELEPHONE (OUTPATIENT)
Dept: NEUROLOGY | Facility: CLINIC | Age: 78
End: 2018-06-15

## 2018-06-15 DIAGNOSIS — R11.0 NAUSEA: Primary | ICD-10-CM

## 2018-06-15 DIAGNOSIS — F02.80 DEMENTIA DUE TO PARKINSON'S DISEASE WITHOUT BEHAVIORAL DISTURBANCE (H): ICD-10-CM

## 2018-06-15 DIAGNOSIS — G20.A1 DEMENTIA DUE TO PARKINSON'S DISEASE WITHOUT BEHAVIORAL DISTURBANCE (H): ICD-10-CM

## 2018-06-15 RX ORDER — RIVASTIGMINE TARTRATE 6 MG/1
6 CAPSULE ORAL 2 TIMES DAILY
Qty: 60 CAPSULE | Refills: 11 | COMMUNITY
Start: 2018-06-15 | End: 2018-11-15

## 2018-06-15 RX ORDER — ONDANSETRON 4 MG/1
TABLET, FILM COATED ORAL
Qty: 90 TABLET | Refills: 3 | Status: SHIPPED | OUTPATIENT
Start: 2018-06-15 | End: 2018-11-15

## 2018-06-15 NOTE — TELEPHONE ENCOUNTER
Received update from Tomasa RN at the care facility ([phone]). She reports that the patient could not tolerate the Exelon patch due to welts and itchiness. The nurse practitioner switched the patient to oral rivastigmine 6 mg cap, 1 cap two times per day. I will update the chart. Page reports the patient has intermittent nausea, her appetite has decreased and her confusion has increased. Patient has lost weight.     Page wondering if the zofran should be scheduled instead of as needed. Will check with provider.

## 2018-06-15 NOTE — TELEPHONE ENCOUNTER
Message from Dr. Cardoza:        Prescription faxed to [facility] [phone] and care facility: [phone]

## 2018-07-23 ENCOUNTER — RECORDS - HEALTHEAST (OUTPATIENT)
Dept: LAB | Facility: CLINIC | Age: 78
End: 2018-07-23

## 2018-07-23 LAB
ERYTHROCYTE [DISTWIDTH] IN BLOOD BY AUTOMATED COUNT: 13.7 % (ref 11–14.5)
HCT VFR BLD AUTO: 42.9 % (ref 35–47)
HGB BLD-MCNC: 13.7 G/DL (ref 12–16)
MCH RBC QN AUTO: 31.2 PG (ref 27–34)
MCHC RBC AUTO-ENTMCNC: 31.9 G/DL (ref 32–36)
MCV RBC AUTO: 98 FL (ref 80–100)
PLATELET # BLD AUTO: 176 THOU/UL (ref 140–440)
PMV BLD AUTO: 9.9 FL (ref 8.5–12.5)
RBC # BLD AUTO: 4.39 MILL/UL (ref 3.8–5.4)
WBC: 8.2 THOU/UL (ref 4–11)

## 2018-11-05 RX ORDER — RIVASTIGMINE 9.5 MG/24H
PATCH, EXTENDED RELEASE TRANSDERMAL
Refills: 11 | COMMUNITY
Start: 2018-01-31 | End: 2018-11-15

## 2018-11-05 RX ORDER — MIRTAZAPINE 7.5 MG/1
TABLET, FILM COATED ORAL
Refills: 99 | COMMUNITY
Start: 2018-11-02 | End: 2018-11-15
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
## 2018-11-15 ENCOUNTER — OFFICE VISIT (OUTPATIENT)
Dept: NEUROLOGY | Facility: CLINIC | Age: 78
End: 2018-11-15
Payer: MEDICARE

## 2018-11-15 VITALS
BODY MASS INDEX: 27 KG/M2 | RESPIRATION RATE: 16 BRPM | SYSTOLIC BLOOD PRESSURE: 124 MMHG | DIASTOLIC BLOOD PRESSURE: 84 MMHG | TEMPERATURE: 97.8 F | OXYGEN SATURATION: 95 % | HEART RATE: 74 BPM | WEIGHT: 172 LBS | HEIGHT: 67 IN

## 2018-11-15 DIAGNOSIS — G20.A1 PARKINSON DISEASE (H): ICD-10-CM

## 2018-11-15 DIAGNOSIS — R11.0 NAUSEA: ICD-10-CM

## 2018-11-15 DIAGNOSIS — Z98.890 S/P BRAIN SURGERY: ICD-10-CM

## 2018-11-15 DIAGNOSIS — G20.A1 PARKINSON'S DISEASE (H): Primary | ICD-10-CM

## 2018-11-15 RX ORDER — CARBIDOPA AND LEVODOPA 25; 100 MG/1; MG/1
TABLET ORAL
Qty: 270 TABLET | Refills: 3 | Status: SHIPPED | OUTPATIENT
Start: 2018-11-15 | End: 2018-11-15

## 2018-11-15 RX ORDER — RIVASTIGMINE TARTRATE 6 MG/1
CAPSULE ORAL
Qty: 60 CAPSULE | Refills: 11 | Status: SHIPPED | OUTPATIENT
Start: 2018-11-15 | End: 2018-11-23

## 2018-11-15 RX ORDER — CARBIDOPA AND LEVODOPA 25; 100 MG/1; MG/1
TABLET ORAL
Qty: 270 TABLET | Refills: 3 | COMMUNITY
Start: 2018-11-15 | End: 2019-05-23

## 2018-11-15 RX ORDER — LUTEIN 6 MG
TABLET ORAL
Qty: 90 TABLET | Refills: 3 | COMMUNITY
Start: 2018-11-15 | End: 2020-01-01

## 2018-11-15 RX ORDER — RIVASTIGMINE TARTRATE 6 MG/1
CAPSULE ORAL
Qty: 60 CAPSULE | Refills: 11 | COMMUNITY
Start: 2018-11-15 | End: 2018-11-15

## 2018-11-15 RX ORDER — CARBIDOPA AND LEVODOPA 50; 200 MG/1; MG/1
1 TABLET, EXTENDED RELEASE ORAL EVERY 6 HOURS
Qty: 360 TABLET | Refills: 3 | Status: SHIPPED | OUTPATIENT
Start: 2018-11-15 | End: 2019-05-17

## 2018-11-15 RX ORDER — ONDANSETRON 4 MG/1
TABLET, FILM COATED ORAL
Qty: 90 TABLET | Refills: 3 | COMMUNITY
Start: 2018-11-15 | End: 2018-12-28

## 2018-11-15 RX ORDER — MIRTAZAPINE 7.5 MG/1
TABLET, FILM COATED ORAL
Qty: 60 TABLET | Refills: 99 | COMMUNITY
Start: 2018-11-15 | End: 2020-05-12

## 2018-11-15 RX ORDER — RIVASTIGMINE TARTRATE 4.5 MG/1
4.5 CAPSULE ORAL 2 TIMES DAILY
Qty: 180 CAPSULE | Refills: 3 | Status: SHIPPED | OUTPATIENT
Start: 2018-11-15 | End: 2018-11-15

## 2018-11-15 RX ORDER — RIVASTIGMINE TARTRATE 4.5 MG/1
CAPSULE ORAL
Qty: 180 CAPSULE | Refills: 3 | Status: SHIPPED | OUTPATIENT
Start: 2018-11-15 | End: 2018-11-29 | Stop reason: DRUGHIGH

## 2018-11-15 RX ORDER — ALBUTEROL SULFATE 0.83 MG/ML
2.5 SOLUTION RESPIRATORY (INHALATION) EVERY 4 HOURS PRN
Qty: 360 ML | COMMUNITY
Start: 2018-11-15 | End: 2018-11-23

## 2018-11-15 ASSESSMENT — PAIN SCALES - GENERAL: PAINLEVEL: NO PAIN (0)

## 2018-11-15 NOTE — NURSING NOTE
Chief Complaint   Patient presents with     Parkinson     [facility] RETURN MOVEMENT DISORDER     Hilario Fofana, EMT

## 2018-11-15 NOTE — Clinical Note
11/15/2018       RE: Eduarda Lazar  [address] [place] [id number]     Dear Colleague,    Thank you for referring your patient, Eduarda Lazar, to the [facility] NEUROLOGY at [facility]. Please see a copy of my visit note below.    No notes on file    Again, thank you for allowing me to participate in the care of your patient.      Sincerely,    Marcelo Cardoza MD

## 2018-11-15 NOTE — MR AVS SNAPSHOT
After Visit Summary   11/15/2018    Eduarda Lazar    MRN: [id number]           Patient Information     Date Of Birth          1940        Visit Information        Provider Department      11/15/2018 12:10 PM Marcelo Cardoza MD [facility] Neurology        Today's Diagnoses     Parkinson's disease (H)    -  1    S/P brain surgery        Nausea        Parkinson disease (H)          Care Instructions      PATIENT: Eduarda Lazar  78 year old female     : 1940    ANDREE: November 15, 2018     Parkinson  dbs bilateral  Dementia  anxiety    [facility]  Address: [address] [place] [address]  Phone: [phone]     Danielle nurse manager  [phone]      Her pcp is Reyna Merrill  [phone]    SINEMET 25/100 1/2 TAB BY MOUTH 2/DAY        WEIGHT  LBS LAST VISIT IN MAY  WEIGHT IS STABLE     Medications     4AM 5AM 8AM 10AM 12 NOON 1PM 3PM 4PM 8PM 9PM 10PM   ACETAMINOPHEN TYLENOL PRN                  CARBIDOPA/LEVODOPA CR 50/200 1    1   1     1   CARBIDOPA/LEVODOPA 25/100 1/2    1   1     1/2   CARBIDOPA/LEVODOPA 25/100   1/2 TAB 2/DAY AS NEEDED             1/2     1/2   Cephalexin keflex NOT TAKING             LUTEIN 20MG    1               MIRTAZAPINE REMERON 7.5mg             1      Ondansetron zofran 4mg  1    1    1    Rantidine zantac 75mg          1    Rivastigmine exelon 6mg      1    1                        History obtained from patient     ISSUES  DIZZINESS ? BLOOD PRESSURE WAS FINE TODAY.   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     SHE HAS BILATERAL DBS AND FAMILY IS WONDERING IF SHE NEEDS HER DBS. MAY NEED FAMILY TO BRING IN HAND HELD CONTROLLER AND TURN OFF THE DEVICE TO SEE HOW MUCH IT IS HELPING HER AS THEY ARE DEBATING WHETHER IT IS WORTH REPLACING THE BATTERIES  GIVEN THE RECOVERY ISSUES WITH SURGERY     PHARMACY CONSULTATION WITH DEVIKA THORPE, PHARM D    PLAN  REDUCE THE RIVASTIGMINE CAPSULES FROM 6MG 2/DAY TO 4.5MG 2/DAY  CONTINUE TO MONITOR PATIENT IN REGARDS TO BLOOD PRESSURE, NAUSEA, DIZZINESS  RETURN VISIT  PHARMACY CONSULTATION to simplify her complicated time schedule as noted below.   WOULD NOT INCREASE SINEMET FOR NOW as  MOST LIKELY MORE SINEMET WILL INCREASE THE RIGHT ARM DYSKINESIAS.   Upon return consider turning off dbs and review pros and cons of battery replacement.       Medications     4AM 5AM 8AM 10AM 12 NOON 1PM 3PM 4PM 8PM 9PM 10PM   ACETAMINOPHEN TYLENOL PRN                  CARBIDOPA/LEVODOPA CR 50/200 1    1   1     1   CARBIDOPA/LEVODOPA 25/100 1/2    1   1     1/2   CARBIDOPA/LEVODOPA 25/100   1/2 TAB 2/DAY AS NEEDED             1/2     1/2   Cephalexin keflex NOT TAKING             LUTEIN 20MG    1               MIRTAZAPINE REMERON 7.5mg             1      Ondansetron zofran 4mg  1    1    1    Rantidine zantac 75mg          1    Rivastigmine exelon 4.5mg      1    1                               Follow-ups after your visit        Additional Services     MED THERAPY MANAGE REFERRAL       Your provider has referred you to: devika thorpe  Reason for Referral: Parkinson    The [facility] department will contact you to schedule an appointment.  You may also schedule the appointment by calling [phone].  For [facility] [place] patients, please call [phone] to confirm/schedule your appointment on the next business day.    This service is designed to help you get the most from your medications.  A specially trained Pharmacist will work closely with you and your providers to solve any questions, concerns, issues or problems related to your medications.    Please bring all of your prescription and non-prescription medications (such as vitamins, over-the-counter medications, and herbals) or a detailed medication  "list to your appointment.    If you have a glucose meter or other home monitoring information, please also bring this to your appointment (i.e. blood glucose log, blood pressure log, pain log, etc.).                  Follow-up notes from your care team     Return in about 3 months (around 2/15/2019).      Your next 10 appointments already scheduled     Feb 18, 2019  2:20 PM CST   (Arrive by 2:05 PM)   Return Movement Disorder with MIRZA Patel [facility] Neurology ([facility])    [address] [place] [id number] [phone]              Who to contact     Please call your clinic at [phone] to:    Ask questions about your health    Make or cancel appointments    Discuss your medicines    Learn about your test results    Speak to your doctor            Additional Information About Your Visit        [employer] Information     [employer] gives you secure access to your electronic health record. If you see a primary care provider, you can also send messages to your care team and make appointments. If you have questions, please call your primary care clinic.  If you do not have a primary care provider, please call [phone] and they will assist you.      [employer] is an electronic gateway that provides easy, online access to your medical records. With [employer], you can request a clinic appointment, read your test results, renew a prescription or communicate with your care team.     To access your existing account, please contact your [facility] or call [phone] for assistance.        Care EveryWhere ID     This is your Care EveryWhere ID. This could be used by other organizations to access your [facility] medical records  [id number]        Your Vitals Were     Pulse Temperature Respirations Height Pulse Oximetry Breastfeeding?    74 97.8  F (36.6  C) (Oral) 16 1.702 m (5' 7\") 95% No    BMI (Body Mass Index)             "       26.94 kg/m2            Blood Pressure from Last 3 Encounters:   11/15/18 124/84   05/15/18 101/56   03/19/18 105/60    Weight from Last 3 Encounters:   11/15/18 78 kg (172 lb)   05/15/18 78.8 kg (173 lb 11.2 oz)   03/19/18 83.7 kg (184 lb 9.6 oz)              We Performed the Following     MED THERAPY MANAGE REFERRAL          Today's Medication Changes          These changes are accurate as of 11/15/18  1:00 PM.  If you have any questions, ask your nurse or doctor.               Start taking these medicines.        Dose/Directions    * rivastigmine 6 MG capsule   Commonly known as:  EXELON   Used for:  Parkinson disease (H)   Started by:  Mareclo Cardoza MD        Discontinue 6MG CAPSULE BY MOUTH TWICE DAILY @ 12 NOON AND 8PM   Quantity:  60 capsule   Refills:  11       * rivastigmine 4.5 MG capsule   Commonly known as:  EXELON   Used for:  Parkinson's disease (H)   Started by:  Marcelo Cardoza MD        4.5mg capsule by mouth twice daily @ 12noon and 8pm   Quantity:  180 capsule   Refills:  3       * Notice:  This list has 2 medication(s) that are the same as other medications prescribed for you. Read the directions carefully, and ask your doctor or other care provider to review them with you.      These medicines have changed or have updated prescriptions.        Dose/Directions    * carbidopa-levodopa  MG per CR tablet   Commonly known as:  SINEMET CR   This may have changed:  Another medication with the same name was added. Make sure you understand how and when to take each.   Used for:  Parkinson disease (H)   Changed by:  Marcelo Cardoza MD        Dose:  1 tablet   Take 1 tablet by mouth every 6 hours At 4 am, 10 am, 3 pm, and 10 pm   Quantity:  360 tablet   Refills:  3       * carbidopa-levodopa  MG per tablet   Commonly known as:  SINEMET   This may have changed:  You were already taking a medication with the same name, and this prescription was added. Make sure you understand  how and when to take each.   Used for:  Parkinson's disease (H)   Changed by:  Marcelo Cardoza MD        Take 1/2 tab at 4 am, 1 tablet at 10 am, & 3 pm, and 1/2 tab 10 pm AND 1/2 TAB BY MOUTH TWICE DAILY AS NEEDED AT 4P AND 10PM   Quantity:  270 tablet   Refills:  3       Lutein 20 MG Tabs   This may have changed:    - how much to take  - how to take this  - when to take this  - additional instructions   Changed by:  Marcelo Cardoza MD        20MG TAB BY MOUTH DAILY @ 8AM   Quantity:  90 tablet   Refills:  3       mirtazapine 7.5 MG Tabs tablet   Commonly known as:  REMERON   This may have changed:    - See the new instructions.  - Another medication with the same name was removed. Continue taking this medication, and follow the directions you see here.   Changed by:  Marcelo Cardoza MD        7.5MG TAB BY MOUTH NIGHTLY @ 8PM   Quantity:  60 tablet   Refills:  99       ondansetron 4 MG tablet   Commonly known as:  ZOFRAN   This may have changed:  additional instructions   Used for:  Nausea   Changed by:  Marcelo Cardoza MD        Take 1 tablet by mouth every 8 hours @ 5AM, 1PM AND 9PM, patient may refuse.   Quantity:  90 tablet   Refills:  3       ranitidine 75 MG tablet   Commonly known as:  ZANTAC   This may have changed:    - how much to take  - how to take this  - when to take this  - additional instructions  - Another medication with the same name was removed. Continue taking this medication, and follow the directions you see here.   Changed by:  Marcelo Cardoza MD        75MG TAB BY MOUTH NIGHTLY @ 9PM   Quantity:  30 tablet   Refills:  0       * Notice:  This list has 2 medication(s) that are the same as other medications prescribed for you. Read the directions carefully, and ask your doctor or other care provider to review them with you.      Stop taking these medicines if you haven't already. Please contact your care team if you have questions.     cephALEXin 500 MG capsule   Commonly  known as:  KEFLEX   Stopped by:  Marcelo Cardoza MD           LUTEIN 20 Caps   Stopped by:  Marcelo Cardoza MD           rivastigmine 9.5 MG/24HR 24 hr patch   Commonly known as:  EXELON   Stopped by:  Marcelo Cardoza MD           traMADol 50 MG tablet   Commonly known as:  ULTRAM   Stopped by:  Marcelo Cardoza MD                Where to get your medicines      These medications were sent to [facility] - [place], [place] - [address]  [address], [place] [address]     Phone:  [phone]     carbidopa-levodopa  MG per CR tablet         Some of these will need a paper prescription and others can be bought over the counter.  Ask your nurse if you have questions.     Bring a paper prescription for each of these medications     rivastigmine 4.5 MG capsule    rivastigmine 6 MG capsule                Primary Care Provider Office Phone # Fax #    Reyna CARRANZA [facility] [phone]       [facility] [address] [address] [facility] [address]        Equal Access to Services     [facility] CenterJOSE MARIA : Hadii aad ku hadasho Soomaali, waaxda luqadaha, qaybta kaalmada adeegyada, mishel smalls . So [facility] [phone].    ATENCIÓN: Si habla español, tiene a art disposición servicios gratuitos de asistencia lingüística. [facility] [phone].    We comply with applicable federal civil rights laws and [place] laws. We do not discriminate on the basis of race, color, national origin, age, disability, sex, sexual orientation, or gender identity.            Thank you!     Thank you for choosing [facility] NEUROLOGY  for your care. Our goal is always to provide you with excellent care. Hearing back from our patients is one way we can continue to improve our services. Please take a few minutes to complete the written survey that you may receive in the mail after your visit with us. Thank you!             Your Updated Medication List  - Protect others around you: Learn how to safely use, store and throw away your medicines at www.disposemymeds.org.          This list is accurate as of 11/15/18  1:00 PM.  Always use your most recent med list.                   Brand Name Dispense Instructions for use Diagnosis    acetaminophen 325 MG tablet    TYLENOL    100 tablet    Take 1 tablet (325 mg) by mouth every 4 hours as needed for pain        albuterol (2.5 MG/3ML) 0.083% neb solution     360 mL    Take 1 vial (2.5 mg) by nebulization every 4 hours as needed for shortness of breath / dyspnea or wheezing        * carbidopa-levodopa  MG per CR tablet    SINEMET CR    360 tablet    Take 1 tablet by mouth every 6 hours At 4 am, 10 am, 3 pm, and 10 pm    Parkinson disease (H)       * carbidopa-levodopa  MG per tablet    SINEMET    270 tablet    Take 1/2 tab at 4 am, 1 tablet at 10 am, & 3 pm, and 1/2 tab 10 pm AND 1/2 TAB BY MOUTH TWICE DAILY AS NEEDED AT 4P AND 10PM    Parkinson's disease (H)       Lutein 20 MG Tabs     90 tablet    20MG TAB BY MOUTH DAILY @ 8AM        mirtazapine 7.5 MG Tabs tablet    REMERON    60 tablet    7.5MG TAB BY MOUTH NIGHTLY @ 8PM        ondansetron 4 MG tablet    ZOFRAN    90 tablet    Take 1 tablet by mouth every 8 hours @ 5AM, 1PM AND 9PM, patient may refuse.    Nausea       ranitidine 75 MG tablet    ZANTAC    30 tablet    75MG TAB BY MOUTH NIGHTLY @ 9PM        * rivastigmine 6 MG capsule    EXELON    60 capsule    Discontinue 6MG CAPSULE BY MOUTH TWICE DAILY @ 12 NOON AND 8PM    Parkinson disease (H)       * rivastigmine 4.5 MG capsule    EXELON    180 capsule    4.5mg capsule by mouth twice daily @ 12noon and 8pm    Parkinson's disease (H)       * Notice:  This list has 4 medication(s) that are the same as other medications prescribed for you. Read the directions carefully, and ask your doctor or other care provider to review them with you.

## 2018-11-15 NOTE — NURSING NOTE
Interrogated patient's DBS Activa SC battery. Patient has bilateral STN. All impedances were checked at 3.0 Volts and were WNL. See neuromodulation sheets scanned. Patient informed.    LSTN Therapy impedance = 829 ohms, 3.172 mA    Contacts used = C+/1-   (Monopolar)  Upper limit = 2.6  Lower limit = 2.6  Current Setting = 2.6    Model 58509    Battery voltage 2.95    RSTN Therapy impedance = 914 ohms, 3.035 mA    Contacts used = 2+/3-   (bipolar)  Upper limit = 2.8  Lower limit = 2.8  Current Setting = 2.8    Model 21899    Battery voltage 2.81

## 2018-11-15 NOTE — PATIENT INSTRUCTIONS
PATIENT: Eduarda Lazar  78 year old female     : 1940    ANDREE: November 15, 2018     Parkinson  dbs bilateral  Dementia  anxiety    [facility]  Address: [address] [place] [address], [place] [id number]  Phone: [phone]     Danielle nurse manager  [phone]      Her pcp is Reyna Merrill  [phone]    SINEMET 25/100 1/2 TAB BY MOUTH 2/DAY        WEIGHT  LBS LAST VISIT IN MAY  WEIGHT IS STABLE     Medications     4AM 5AM 8AM 10AM 12 NOON 1PM 3PM 4PM 8PM 9PM 10PM   ACETAMINOPHEN TYLENOL PRN                  CARBIDOPA/LEVODOPA CR 50/200 1    1   1     1   CARBIDOPA/LEVODOPA 25/100 1/2    1   1     1/2   CARBIDOPA/LEVODOPA 25/100   1/2 TAB 2/DAY AS NEEDED             1/2     1/2   Cephalexin keflex NOT TAKING             LUTEIN 20MG    1               MIRTAZAPINE REMERON 7.5mg             1      Ondansetron zofran 4mg  1    1    1    Rantidine zantac 75mg          1    Rivastigmine exelon 6mg      1    1                        History obtained from patient     ISSUES  DIZZINESS ? BLOOD PRESSURE WAS FINE TODAY.   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     SHE HAS BILATERAL DBS AND FAMILY IS WONDERING IF SHE NEEDS HER DBS. MAY NEED FAMILY TO BRING IN HAND HELD CONTROLLER AND TURN OFF THE DEVICE TO SEE HOW MUCH IT IS HELPING HER AS THEY ARE DEBATING WHETHER IT IS WORTH REPLACING THE BATTERIES GIVEN THE RECOVERY ISSUES WITH SURGERY     PHARMACY CONSULTATION WITH DEVIKA THORPE, PHARM D    PLAN  REDUCE THE RIVASTIGMINE CAPSULES FROM 6MG 2/DAY TO 4.5MG 2/DAY  CONTINUE TO MONITOR PATIENT IN REGARDS TO BLOOD PRESSURE, NAUSEA, DIZZINESS  RETURN VISIT  PHARMACY CONSULTATION to simplify her complicated time schedule as noted below.   WOULD NOT INCREASE SINEMET FOR NOW as  MOST LIKELY MORE SINEMET WILL INCREASE THE RIGHT ARM  DYSKINESIAS.   Upon return consider turning off dbs and review pros and cons of battery replacement.       Medications     4AM 5AM 8AM 10AM 12 NOON 1PM 3PM 4PM 8PM 9PM 10PM   ACETAMINOPHEN TYLENOL PRN                  CARBIDOPA/LEVODOPA CR 50/200 1    1   1     1   CARBIDOPA/LEVODOPA 25/100 1/2    1   1     1/2   CARBIDOPA/LEVODOPA 25/100   1/2 TAB 2/DAY AS NEEDED             1/2     1/2   Cephalexin keflex NOT TAKING             LUTEIN 20MG    1               MIRTAZAPINE REMERON 7.5mg             1      Ondansetron zofran 4mg  1    1    1    Rantidine zantac 75mg          1    Rivastigmine exelon 4.5mg      1    1
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
## 2018-11-19 ENCOUNTER — TELEPHONE (OUTPATIENT)
Dept: PHARMACY | Facility: OTHER | Age: 78
End: 2018-11-19

## 2018-11-19 NOTE — TELEPHONE ENCOUNTER
MTM referral from: [facility] visit (referral by provider)    MTM referral outreach attempt #2 on November 19, 2018 at 2:12 PM      Outcome: Patient not reachable after several attempts, will route to MTM Pharmacist/Provider as an FYI. Thank you for the referral.    Vivienne Marino, MTM Coordinator

## 2018-11-23 ENCOUNTER — ALLIED HEALTH/NURSE VISIT (OUTPATIENT)
Dept: PHARMACY | Facility: CLINIC | Age: 78
End: 2018-11-23
Payer: COMMERCIAL

## 2018-11-23 DIAGNOSIS — F02.80 DEMENTIA DUE TO PARKINSON'S DISEASE WITHOUT BEHAVIORAL DISTURBANCE (H): ICD-10-CM

## 2018-11-23 DIAGNOSIS — F32.A DEPRESSION, UNSPECIFIED DEPRESSION TYPE: ICD-10-CM

## 2018-11-23 DIAGNOSIS — K21.9 GASTROESOPHAGEAL REFLUX DISEASE, ESOPHAGITIS PRESENCE NOT SPECIFIED: ICD-10-CM

## 2018-11-23 DIAGNOSIS — G20.A1 DEMENTIA DUE TO PARKINSON'S DISEASE WITHOUT BEHAVIORAL DISTURBANCE (H): ICD-10-CM

## 2018-11-23 DIAGNOSIS — G20.A1 PARALYSIS AGITANS (H): Primary | ICD-10-CM

## 2018-11-23 DIAGNOSIS — H35.30 MACULAR DEGENERATION (SENILE) OF RETINA: ICD-10-CM

## 2018-11-23 PROCEDURE — 99605 MTMS BY PHARM NP 15 MIN: CPT | Mod: U4 | Performed by: PHARMACIST

## 2018-11-23 PROCEDURE — 99607 MTMS BY PHARM ADDL 15 MIN: CPT | Mod: U4 | Performed by: PHARMACIST

## 2018-11-23 NOTE — Clinical Note
I think the primary thing with her will be going off rivastigmine. She had some really significant nausea and dizziness and is now taking scheduled Zofran and Zantac just to manage side effects. I think it's good you reduced the dose and I would like to taper off altogether if we can. How quickly do you think we can taper off rivastigmine? Each dose for 2 weeks? I can't find any recommendations on tapering off.

## 2018-11-23 NOTE — MR AVS SNAPSHOT
After Visit Summary   11/23/2018    Eduarda Lazar    MRN: [id number]           Patient Information     Date Of Birth          1940        Visit Information        Provider Department      [facility]23/2018 12:30 [facility]        Today's Diagnoses     Paralysis agitans (H)    -  1    Dementia due to Parkinson's disease without behavioral disturbance (H)        Depression, unspecified depression type        Gastroesophageal reflux disease, esophagitis presence not specified        Macular degeneration (senile) of retina           Follow-ups after your visit        Follow-up notes from your care team     Return in 4 weeks (on 12/20/2018) for Medication Therapy Management.      Your next 10 appointments already scheduled     Dec 20, 2018 12:30 PM CST   TELEMEDICINE with July Quan [facility] ([facility])    [address] [place] [id number] [phone]           Note: this is not an onsite visit; there is no need to come to the facility.            Feb 18, 2019  2:20 PM CST   (Arrive by 2:05 PM)   Return Movement Disorder with MIRZA Patel [facility] ([facility])    [address] [place] [address] [phone]              Who to contact     If you have questions or need follow up information about today's clinic visit or your schedule please contact [facility]UROLOG[facility] directly at [phone].  Normal or non-critical lab and imaging results will be communicated to you by MyChart, letter or phone within 4 business days after the clinic has received the results. If you do not hear from us within 7 days, please contact the clinic through MyChart or phone. If you have a critical or abnormal lab result, we will notify you by phone as soon as possible.  Submit refill requests through  [employer] or call your pharmacy and they will forward the refill request to us. Please allow 3 business days for your refill to be completed.          Additional Information About Your Visit        MyChart Information     [employer] gives you secure access to your electronic health record. If you see a primary care provider, you can also send messages to your care team and make appointments. If you have questions, please call your primary care clinic.  If you do not have a primary care provider, please call [phone] and they will assist you.        Care EveryWhere ID     This is your Care EveryWhere ID. This could be used by other organizations to access your [facility] medical records  [id number]         Blood Pressure from Last 3 Encounters:   11/15/18 124/84   05/15/18 101/56   03/19/18 105/60    Weight from Last 3 Encounters:   11/15/18 172 lb (78 kg)   05/15/18 173 lb 11.2 oz (78.8 kg)   03/19/18 184 lb 9.6 oz (83.7 kg)              Today, you had the following     No orders found for display       Primary Care Provider Office Phone # Fax #    Reyna CARRANZA [facility] [phone]       [facility] [id number] [place] [address]        Equal Access to Services     MANUEL HADLEY : Hadii aad ku hadasho Soomaali, waaxda luqadaha, qaybta kaalmada adeegyada, waxay idiin haymazinn mahendraeg ermias laprudencio fritz. So [facility] [phone].    ATENCIÓN: Si habla español, tiene a art disposición servicios gratuitos de asistencia lingüística. Llame al [phone].    We comply with applicable federal civil rights laws and [place] laws. We do not discriminate on the basis of race, color, national origin, age, disability, sex, sexual orientation, or gender identity.            Thank you!     Thank you for choosing [facility] NEUROLOGY [facility]  for your care. Our goal is always to provide you with excellent care. Hearing back from our patients is one way we can continue to improve our services. Please take a  few minutes to complete the written survey that you may receive in the mail after your visit with us. Thank you!             Your Updated Medication List - Protect others around you: Learn how to safely use, store and throw away your medicines at www.disposemymeds.org.          This list is accurate as of 11/23/18 11:59 PM.  Always use your most recent med list.                   Brand Name Dispense Instructions for use Diagnosis    acetaminophen 325 MG tablet    TYLENOL    100 tablet    Take 1 tablet (325 mg) by mouth every 4 hours as needed for pain        * carbidopa-levodopa  MG per CR tablet    SINEMET CR    360 tablet    Take 1 tablet by mouth every 6 hours At 4 am, 10 am, 3 pm, and 10 pm    Parkinson disease (H)       * carbidopa-levodopa  MG per tablet    SINEMET    270 tablet    Take 1/2 tab at 4 am, 1 tablet at 10 am, & 3 pm, and 1/2 tab 10 pm AND 1/2 TAB BY MOUTH TWICE DAILY AS NEEDED AT 4P AND 10PM    Parkinson's disease (H)       Lutein 20 MG Tabs     90 tablet    20MG TAB BY MOUTH DAILY @ 8AM        mirtazapine 7.5 MG tablet    REMERON    60 tablet    7.5MG TAB BY MOUTH NIGHTLY @ 8PM        ondansetron 4 MG tablet    ZOFRAN    90 tablet    Take 1 tablet by mouth every 8 hours @ 5AM, 1PM AND 9PM, patient may refuse.    Nausea       ranitidine 75 MG tablet    ZANTAC    30 tablet    75MG TAB BY MOUTH NIGHTLY @ 9PM        rivastigmine 4.5 MG capsule    EXELON    180 capsule    4.5mg capsule by mouth twice daily @ 12noon and 8pm    Parkinson's disease (H)       * Notice:  This list has 2 medication(s) that are the same as other medications prescribed for you. Read the directions carefully, and ask your doctor or other care provider to review them with you.

## 2018-11-23 NOTE — PROGRESS NOTES
"SUBJECTIVE/OBJECTIVE:                           Eduarda Lazar is a 78 year old female called for an initial visit for Medication Therapy Management.  She was referred to me from Dr. Cardoza. Today's visit was conducted with the patient's daughter, Aixa, as the patient has dementia (consent to communicate on file).     Chief Complaint: Initial MTM visit - would like to eliminate medications    Allergies/ADRs: Reviewed in Epic  Tobacco: No tobacco use  Alcohol: none  Caffeine: no caffeine  Activity: minimal  PMH: Reviewed in Epic    Medication Adherence/Access:  Patient has assistance with medication administration: nursing home.  Patient takes medications 11 time(s) per day.   Per patient, misses medication 0 times per week.   Medication barriers: none.   The patient fills medications at [facility]: NO, [facility].    Parkinson's Disease:  Current medications include: Carbidopa-levodopa (see below) and she also has PRN doses available (has taken 7 PRN tablets in the past month). Daughter states that carbidopa-levodopa has been working well for managing her PD symptoms. She has been having a \"shoulder roll\" which Dr. Cardoza suspects is actually dyskinesia per their last clinic visit. Daughter is unsure when the shoulder symptom is worst.  Medications     4AM 10AM 3PM 10PM   CARBIDOPA/LEVODOPA CR 50/200 mg 1 1 1 1   CARBIDOPA/LEVODOPA IR 25/100 mg 0.5 1 1 0.5     PD Dementia: Currently taking rivastigmine 4.5 mg twice daily (recently reduced from 6 mg twice daily). She previously had tried rivastigmine patch but it caused itching on her back and skin peeling. After stopping the patch, her cognition worsened, so rivastigmine capsules were started and she had significant dizziness and nausea. Ondansetron was used to manage the nausea and over time it improved, but she still has dizziness. It is unclear how many times/day she takes ondansetron, but it is scheduled for 3 PRN doses. Her " cognition has not improved with the rivastigmine capsules and at last visit, Dr. Cardoza reduced the dose to 4.5 mg.    Depression:  Current medications include: Mirtazapine 7.5 mg once daily at night. Daughter states this has been better for her mood, but did not extrapolate.    GERD: Current medications include: Zantac (ranitidine) 75 mg nightly. Daughter was unaware that this medication was being used and she states she never had heartburn in the past. She suspects it was started because of he side effects of rivastigmine.     Macular degeneration: Takes Lutein 20 mg daily. Has been taking this for many years.    Follow up with [facility]:  11/23/18: Left message with Danielle nurse manager, at [facility] to discuss timing of medications, purpose of ranitidine, and frequency of ondansetron use. Awaiting call back to discuss.  11/27/18: Received medication list from IRENE Salazar at [facility] via fax. Called again and left voicemail message for Marie to return my call to discuss above.   11/28/18:  Spoke with IRENE Murcia, who states she has taken 7 PRN doses of carbidopa-levodopa IR tablets in the past month at the following times: 11:50 am, 6:57 pm, 7:50 pm, 12:04 pm, 1:06 pm, 11:09 am, 7:24 pm. She states that patient is taking ondansetron and ranitidine scheduled and these were started because of side effects from rivastigmine.     ASSESSMENT:                             Current medications were reviewed today. Medicare Part D topics discussed:Medication changes    Medication Adherence: excellent, needs improvement - patient may benefit from less medication dosing times.     Parkinson's Disease:  Needs improvement. It is unclear if the shoulder movement is due to too much levodopa. Daughter was only able to provide limited information about her observations of the patient at certain times and care center is also unsure about when her shoulder movement occurs. Because she is getting PRN doses  only a few times/month, would not recommend changing her PD regimen at this time.    PD Dementia: Needs improvement. Patient seems to have had significant side effects from rivastigmine which have warranted additional medications (ondansetron and possibly ranitidine). Since she has not seen benefit from the medications, she may benefit from tapering off rivastigmine and possibly trying donepezil or memantine in the future. Memantine may be preferred from a side-effect profile standpoint.    Depression:  Improved.     GERD: Needs improvement. Ranitidine is being used due to side effects of rivastigmine, so would recommend going off ranitidine after rivastigmine is tapered off.    Macular degeneration: Not fully assessed.    PLAN:                            1. Will taper off rivastigmine (currently down to 4.5 mg twice daily).  2. Goal to eliminate ondansetron and ranitidine after patient is off rivastigmine.  3. No changes to carbidopa-levodopa at this time.  Addendum 11/29/18: Discussed with Dr. Cardoza and he agreed that we taper off rivastigmine following a schedule of 3 mg BID x 1 week, 1.5 mg BID x 1 week, then stop. Sent Rx to [facility] and faxed to [facility] [phone]. Spoke with daughter, Aixa, and will be considering starting memantine after she is off of rivastigmine.    I spent 30 minutes with this patient today (an extra 15 minutes was spent creating the Medication Action Plan). All changes were made via collaborative practice agreement with Dr. Cardoza. A copy of the visit note was provided to the patient's referring provider.    Will follow up in one month: 12/20/18.    The patient was mailed a summary of these recommendations as an after visit summary.     July Quan, Pharm.D.  Medication Therapy Management Pharmacist  Phone: [phone]

## 2018-11-23 NOTE — LETTER
"     [facility] NEUROLOGY [facility]IC MT     Date: 2018    Eduarda Lazar  [address] [place] [id number]    Dear Ms. Lazar,    Thank you for talking with me on 18 about your health and medications. Medicare s MTM (Medication Therapy Management) program helps you understand your medications and use them safely.      This letter includes an action plan (Medication Action Plan) and medication list (Personal Medication List). The action plan has steps you should take to help you get the best results from your medications. The medication list will help you keep track of your medications and how to use them the right way.       Have your action plan and medication list with you when you talk with your doctors, pharmacists, and other healthcare providers in your care team.     Ask your doctors, pharmacists, and other healthcare providers to update the action plan and medication list at every visit.     Take your medication list with you if you go to the hospital or emergency room.     Give a copy of the action plan and medication list to your family or caregivers.     If you want to talk about this letter or any of the papers with it, please call   [phone].   We look forward to working with you, your doctors, and other healthcare providers to help you stay healthy.     Sincerely,    July Quan      Enclosed: Medication Action Plan and Personal Medication List    MEDICATION ACTION PLAN FOR Eduarda Lazar,  1940     This action plan will help you get the best results from your medications if you:   1. Read \"What we talked about.\"   2. Take the steps listed in the \"What I need to do\" boxes.   3. Fill in \"What I did and when I did it.\"   4. Fill in \"My follow-up plan\" and \"Questions I want to ask.\"     Have this action plan with you when you talk with your doctors, pharmacists, and other healthcare providers in your care team. Share this with your family or caregivers " too.  DATE PREPARED: 2018  What we talked about: going off rivastigmine, ondansetron, and ranitidine                                       What I need to do: take rivastigmine 4.5 mg twice daily and we will instruct you to continue to decrease the dose over time   What I did and when I did it:                                              My follow-up plan:  July Quan, PharmD will call Aixa on 18 at 12:30 pm         Questions I want to ask:              If you have any questions about your action plan, call July Quan, Phone: [phone] , Monday-Friday 8-4:30pm.  MEDICATION LIST FOR Eduarda Lazar,  1940     This medication list was made for you after we talked. We also used information from your doctor's chart.      Use blank rows to add new medications. Then fill in the dates you started using them.    Cross out medications when you no longer use them. Then write the date and why you stopped using them.    Ask your doctors, pharmacists, and other healthcare providers to update this list at every visit. Keep this list up-to-date with:       Prescription medications    Over the counter drugs     Herbals    Vitamins    Minerals      If you go to the hospital or emergency room, take this list with you. Share this with your family or caregivers too.     DATE PREPARED: 2018  Allergies or side effects: Barbiturates; Camphor; Comtan; Exelon [rivastigmine]; Oxycodone; Vancomycin; Clindamycin hcl; and Sulfa drugs     Medication:  ACETAMINOPHEN 325 MG PO TABS      How I use it:  Take 1 tablet (325 mg) by mouth every 4 hours as needed for pain      Why I use it:  Pain    Prescriber:  OTC      Date I started using it:       Date I stopped using it:         Why I stopped using it:            Medication:  CARBIDOPA-LEVODOPA  MG PO TABS      How I use it:  Take 1/2 tab at 4 am, 1 tablet at 10 am, & 3 pm, and 1/2 tab 10 pm AND 1/2 TAB BY MOUTH TWICE DAILY AS NEEDED AT 4P AND 10PM       Why I use it: Parkinson's disease     Prescriber:  Marcelo Cardoza MD      Date I started using it:       Date I stopped using it:         Why I stopped using it:                Medication:  CARBIDOPA-LEVODOPA ER  MG PO TBCR      How I use it:  Take 1 tablet by mouth every 6 hours At 4 am, 10 am, 3 pm, and 10 pm      Why I use it: Parkinson disease    Prescriber:  Marcelo Cardoza MD      Date I started using it:       Date I stopped using it:         Why I stopped using it:            Medication:  LUTEIN 20 MG PO TABS      How I use it:  20MG TAB BY MOUTH DAILY @ 8AM      Why I use it:  Macular degeneration    Prescriber:  OTC      Date I started using it:       Date I stopped using it:         Why I stopped using it:            Medication:  MIRTAZAPINE 7.5 MG PO TABS      How I use it:  7.5MG TAB BY MOUTH NIGHTLY @ 8PM      Why I use it:  Depression    Prescriber:  Marcelo Cardoza MD      Date I started using it:       Date I stopped using it:         Why I stopped using it:            Medication:  ONDANSETRON HCL 4 MG PO TABS      How I use it:  Take 1 tablet by mouth every 8 hours @ 5AM, 1PM AND 9PM, patient may refuse.      Why I use it: Nausea    Prescriber:  Marcelo Cardoza MD      Date I started using it:       Date I stopped using it:         Why I stopped using it:            Medication:  RANITIDINE HCL 75 MG PO TABS      How I use it:  75MG TAB BY MOUTH NIGHTLY @ 9PM      Why I use it:  Nausea    Prescriber:  Marcelo Cardoza MD      Date I started using it:       Date I stopped using it:         Why I stopped using it:                      Medication:  RIVASTIGMINE TARTRATE 4.5 MG PO CAPS      How I use it:  4.5mg capsule by mouth twice daily @ 12noon and 8pm      Why I use it: Parkinson's disease dementia    Prescriber:  Marcelo Cardoza MD      Date I started using it:       Date I stopped using it:         Why I stopped using it:            Medication:         How I use it:          Why I use it:      Prescriber:         Date I started using it:       Date I stopped using it:         Why I stopped using it:            Medication:         How I use it:         Why I use it:      Prescriber:         Date I started using it:       Date I stopped using it:         Why I stopped using it:            Medication:         How I use it:         Why I use it:      Prescriber:         Date I started using it:       Date I stopped using it:         Why I stopped using it:            Other Information:     If you have any questions about your action plan, call [phone].  According to the Paperwork Reduction Act of 1995, no persons are required to respond to a collection of information unless it displays a valid OMB control number. The valid OMB number for this information collection is [id number]. The time required to complete this information collection is estimated to average 40 minutes per response, including the time to review instructions, searching existing data resources, gather the data needed, and complete and review the information collection. If you have any comments concerning the accuracy of the time estimate(s) or suggestions for improving this form, please write to: CMS, Attn: ADILENE Reports Clearance Officer, [address] [place] [phone].

## 2018-11-29 ENCOUNTER — TELEPHONE (OUTPATIENT)
Dept: PHARMACY | Facility: CLINIC | Age: 78
End: 2018-11-29

## 2018-11-29 RX ORDER — RIVASTIGMINE TARTRATE 1.5 MG/1
CAPSULE ORAL
Qty: 21 CAPSULE | Refills: 0 | Status: SHIPPED | OUTPATIENT
Start: 2018-11-29 | End: 2018-12-27

## 2018-11-29 RX ORDER — RIVASTIGMINE TARTRATE 1.5 MG/1
CAPSULE ORAL
Qty: 21 CAPSULE | Refills: 0 | Status: SHIPPED | OUTPATIENT
Start: 2018-11-29 | End: 2018-11-29

## 2018-11-29 NOTE — TELEPHONE ENCOUNTER
Called daughter, Aixa, and left message for her to call me back to discuss.     I did discuss the medication change with Dr. Cardoza and we agreed that it would be reasonable for us to switch to memantine, which has a lower risk of side effects but less evidence of effectiveness in PD.    July Quan, Pharm.D.  Medication Therapy Management Pharmacist  Phone: [phone]

## 2018-11-29 NOTE — TELEPHONE ENCOUNTER
IRENE Salazar called from [facility] and states that since reducing the dose of rivastigmine to 4.5 mg twice a day she has had worsening cognition. She forgets how to do transfers with the staff and has been more confused. IRENE Salazar would like for us to discuss with family and decide whether we should continue to have her go off of rivastigmine at this point.    July Quan, Pharm.D.  Medication Therapy Management Pharmacist  Phone: [phone]

## 2018-11-30 NOTE — TELEPHONE ENCOUNTER
Spoke with Marie, RN, who states that one nurse was concerned about decline in her cognition but the other nurses have not noticed significant changes. Then also spoke with daughter, Aixa, who also has not noticed cognitive changes in the last couple weeks, so we collaboratively decided to still taper her off rivastigmine and we will discuss starting memantine at our next [facility] call on 12/20/18.    July Quan, Pharm.D.  Medication Therapy Management Pharmacist  Phone: [phone]

## 2018-12-20 ENCOUNTER — ALLIED HEALTH/NURSE VISIT (OUTPATIENT)
Dept: PHARMACY | Facility: CLINIC | Age: 78
End: 2018-12-20
Payer: COMMERCIAL

## 2018-12-20 DIAGNOSIS — G20.A1 DEMENTIA DUE TO PARKINSON'S DISEASE WITHOUT BEHAVIORAL DISTURBANCE (H): ICD-10-CM

## 2018-12-20 DIAGNOSIS — R11.0 NAUSEA: ICD-10-CM

## 2018-12-20 DIAGNOSIS — G20.A1 PARALYSIS AGITANS (H): Primary | ICD-10-CM

## 2018-12-20 DIAGNOSIS — F02.80 DEMENTIA DUE TO PARKINSON'S DISEASE WITHOUT BEHAVIORAL DISTURBANCE (H): ICD-10-CM

## 2018-12-20 PROCEDURE — 99606 MTMS BY PHARM EST 15 MIN: CPT | Mod: U4 | Performed by: PHARMACIST

## 2018-12-20 NOTE — PROGRESS NOTES
SUBJECTIVE/OBJECTIVE:                Eduarda Lazar is a 78 year old female called for a follow-up visit for Medication Therapy Management.  She was referred to me from Dr. Cardoza. Today's visit was conducted with daughter, Aixa (consent to communicate on file).     Chief Complaint: Follow up from our visit on 11/23/18  Tobacco: No tobacco use  Alcohol: none    Medication Adherence/Access: no issues reported    Parkinson's Disease:  Current medications include: carbidopa-levodopa (see below) and she has PRN doses available which she can self-administer. Nursing home administers the scheduled doses. PD symptoms have been stable.   Medications     4AM 10AM 3PM 10PM   CARBIDOPA/LEVODOPA CR 50/200 mg 1 1 1 1   CARBIDOPA/LEVODOPA IR 25/100 mg 0.5 1 1 0.5     PD Dementia/nausea: Not currently taking any medications. Patient recently tapered off rivastigmine due to nausea/dizziness caused by the medication. She has had no changes in cognition and her nausea/dizziness has completely subsided. She continues to take ondansetron 4 mg 3 times daily and ranitidine 75 mg nightly despite the nausea resolving.     ASSESSMENT:              Current medications were reviewed today as discussed above.      Medication Adherence: excellent, no issues identified    Parkinson's Disease: Stable.     PD Dementia/nausea: Needs improvement. Now that patient is off rivastigmine and nausea has resolved, she would benefit from stopping ondansetron and ranitidine. Could consider trying memantine for dementia after these medications have been discontinued.      PLAN:                  Recommendations to patient's care facility:   1. Discontinue ondansetron.  2. Discontinue ranitidine.   Left voicemail message for IRENE Salazar at [phone].  Addendum 12/27/18: IRENE Salazar called back and states she put in a request to her provider last week regarding these recommendations. She will call back when she hears from the provider.  Addendum 12/28/18:  Marie RN called back to state that her primary primary discontinued ondansetron and ranitidine and they will monitor for any worsening nausea.     Future considerations:   Trial of memantine  Addendum 12/28/18: Called daughter, Aixa to inform her of the above. She will talk with family about a trial of memantine and will let us know if they decide to pursue this.    I spent 15 minutes with this patient today. All changes were made via collaborative practice agreement with Dr. Cardoza. A copy of the visit note was provided to the patient's referring provider.     Will follow up in 1-2 months.     The patient declined a summary of these recommendations as an after visit summary.    July Quan, Pharm.D.  Medication Therapy Management Pharmacist  Phone: [phone]

## 2018-12-20 NOTE — Clinical Note
FYSHERRY she did fine tapering off rivastigmine, so I have a message into her nursing home provider to discontinue the ondansetron and ranitidine. We may try memantine after she gets off these other medications.

## 2019-01-07 ENCOUNTER — TELEPHONE (OUTPATIENT)
Dept: PHARMACY | Facility: CLINIC | Age: 79
End: 2019-01-07

## 2019-01-07 DIAGNOSIS — G20.A1 DEMENTIA DUE TO PARKINSON'S DISEASE WITHOUT BEHAVIORAL DISTURBANCE (H): Primary | ICD-10-CM

## 2019-01-07 DIAGNOSIS — F02.80 DEMENTIA DUE TO PARKINSON'S DISEASE WITHOUT BEHAVIORAL DISTURBANCE (H): Primary | ICD-10-CM

## 2019-01-07 RX ORDER — MEMANTINE HYDROCHLORIDE 5 MG/1
TABLET ORAL
Qty: 120 TABLET | Refills: 11 | Status: SHIPPED | OUTPATIENT
Start: 2019-01-07 | End: 2019-01-14

## 2019-01-07 NOTE — TELEPHONE ENCOUNTER
Patient's daughter, Aixa, left voicemail message for MTM pharmacist stating that family would like to start memantine (see MTM encounter dated 12/20/18). Order for memantine placed to [facility] per collaborative practice agreement with Dr. Cardoza. Patient is scheduled to see Margarita Zepeda for follow up on 2/18/18.    July Quan, Pharm.D.  Medication Therapy Management Pharmacist  Phone: [phone]

## 2019-01-14 RX ORDER — MEMANTINE HYDROCHLORIDE 5 MG/1
TABLET ORAL
Qty: 120 TABLET | Refills: 11 | Status: SHIPPED | OUTPATIENT
Start: 2019-01-14 | End: 2019-05-17

## 2019-01-14 NOTE — TELEPHONE ENCOUNTER
Patient's daughter, Aixa, called and states [facility] has not received the order. Faxed order to [facility]. Order sent to Faye Jerry on 1/7/19.    July Quan, Pharm.D.  Medication Therapy Management Pharmacist  Phone: [phone]

## 2019-02-18 ENCOUNTER — OFFICE VISIT (OUTPATIENT)
Dept: NEUROLOGY | Facility: CLINIC | Age: 79
End: 2019-02-18
Payer: MEDICARE

## 2019-02-18 VITALS
WEIGHT: 171 LBS | RESPIRATION RATE: 16 BRPM | DIASTOLIC BLOOD PRESSURE: 72 MMHG | HEIGHT: 67 IN | TEMPERATURE: 97.8 F | SYSTOLIC BLOOD PRESSURE: 132 MMHG | BODY MASS INDEX: 26.84 KG/M2 | HEART RATE: 64 BPM | OXYGEN SATURATION: 96 %

## 2019-02-18 DIAGNOSIS — G20.A1 DEMENTIA DUE TO PARKINSON'S DISEASE WITHOUT BEHAVIORAL DISTURBANCE (H): ICD-10-CM

## 2019-02-18 DIAGNOSIS — Z98.890 S/P BRAIN SURGERY: ICD-10-CM

## 2019-02-18 DIAGNOSIS — F02.80 DEMENTIA DUE TO PARKINSON'S DISEASE WITHOUT BEHAVIORAL DISTURBANCE (H): ICD-10-CM

## 2019-02-18 DIAGNOSIS — G20.A1 PARKINSON'S DISEASE (H): Primary | ICD-10-CM

## 2019-02-18 RX ORDER — ASPIRIN 81 MG/1
81 TABLET ORAL PRN
COMMUNITY
End: 2019-05-23

## 2019-02-18 ASSESSMENT — PAIN SCALES - GENERAL: PAINLEVEL: NO PAIN (0)

## 2019-02-18 ASSESSMENT — MIFFLIN-ST. JEOR: SCORE: 1288.28

## 2019-02-18 NOTE — PATIENT INSTRUCTIONS
February 18, 2019    Dear Ms. Eduarda Cade Nagi,    Thank you for coming today.  During your visit, we have discussed the following:     __ Your DBS electrical system function (impedance) is normal. The battery life is also good.  The right side DBS generator is getting low.  We'll check it in 3 months and will refer you to see a neurosurgeon for battery replacement.     __  Stay on the same antiparkinsonian medications.     __  Return in 3 months.      To contact our clinic, you may call [phone] or use [employer] message.     It's good to see you!        MIRZA Donnelly, CNP  [facility]

## 2019-02-18 NOTE — NURSING NOTE
Chief Complaint   Patient presents with     Parkinson     [facility] RETURN MOVEMENT DISORDER 3-6 MONTH F/U       Hilario Fofana, EMT

## 2019-02-18 NOTE — Clinical Note
2019       RE: Eduarda Lazar  [address] [place] [id number]     Dear Colleague,    Thank you for referring your patient, Eduarda Lazar, to the [facility] NEUROLOGY at [facility]. Please see a copy of my visit note below.    PATIENT: Eduarda Lazar    : 1940    ANDREE: 2019    REASON FOR VISIT:  Parkinson's disease (PD) follow up and deep brain stimulation.     HPI: Ms. Eduarda Lazar is a 78 year old *** handed *** female who came to the [facility] neurology clinic accompanied by her *** for a follow up visit.  She was last seen in the clinic on 11/15/2018 by Dr. Cardoza for a routine f/u visit.  During that visit, the following were discussed: -    The facility has Bingo and cards, but she doesn't participati.     She has been falling.  Mostly, she is on a wheelchair.  When she walks, she usues a walker and an assitant.     She reports no change in dizziness.  She has dementia and hard to know if this is accurate.  He son reports taht she sometimes complains about dizzinee, but not always.     She is at [facility] in [place], [place].    Hand .  They called poison control.     Had 3 falls.     PD Medications *** *** *** *** *** *** ***   Sinemet 25/100 mg                       ROS: -   MOTOR FUNCTION   Tremor: Denies. (affected body, when occurs).   Bradykinesia: Denies.  Speech: Denies..   Rigidity: Denies.   Gait: Denies. *** freezing, *** shuffling. *** assistive device.  Falls: Denies. How often, when does it occur?  Any PT/OT?  Dyskinesia: Denies. (affected body, when occurs):  Dystonia: Denies.  Pain: Denies.  Numbness: Denies.   Tingling: Denies.     AUTONOMIC FUNCTION   Orthostatic Hypotension: Denies.   Constipation: Denies.   Urinary symptoms: Denies.   Erectile Dysfunction: Denies.     MENTATION, BEHAVIOR, MOOD   Depression, anxiety: Denies. *** Stable on meds.  Fatigue: Denies.   Memory problems: ***  word finding?  Confusion, hallucinations: Denies.   Sleep Problems: *** snoring. *** REM behavior disorder.    OTHERS   Trouble swallowing, drooling: Denies.  Changes in sense of smelling: Denies.  Trouble with handwriting: Denies.  Trouble with handling utensils, dressing: Denies.   Skin problems: Denies.  Exercise: Denies.    MEDICATIONS:   Outpatient Medications Marked as Taking for the 2/18/19 encounter (Office Visit) with Clinton Zepeda APRN CNP   Medication Sig     acetaminophen (TYLENOL) 325 MG tablet Take 1 tablet (325 mg) by mouth every 4 hours as needed for pain     aspirin 81 MG EC tablet Take 81 mg by mouth as needed for moderate pain     carbidopa-levodopa (SINEMET CR)  MG per CR tablet Take 1 tablet by mouth every 6 hours At 4 am, 10 am, 3 pm, and 10 pm     carbidopa-levodopa (SINEMET)  MG per tablet Take 1/2 tab at 4 am, 1 tablet at 10 am, & 3 pm, and 1/2 tab 10 pm AND 1/2 TAB BY MOUTH TWICE DAILY AS NEEDED AT 4P AND 10PM     Lutein 20 MG TABS 20MG TAB BY MOUTH DAILY @ 8AM     memantine (NAMENDA) 5 MG tablet 1 tab daily x 1 week, then 1 tab 2x/day x 1 week, then 2 tabs (10 mg) in the AM and 1 tab (5 mg) in the PM x 1 week, then 2 tabs twice daily     mirtazapine (REMERON) 7.5 MG TABS tablet 7.5MG TAB BY MOUTH NIGHTLY @ 8PM       ALLERGIES: Barbiturates; Camphor; Comtan; Exelon [rivastigmine]; Oxycodone; Vancomycin; Clindamycin hcl; and Sulfa drugs    PROBLEM LIST: does not have any pertinent problems on file.    PMH:  has a past medical history of Anxiety, Asthma (01/12/2005), Cervical spine arthritis, Depression, H/O Atrioventricular kennedy re-entry tachycardia (H) (10/01/2007), Murmur, cardiac (11/11/2015), Obesity, Osteoarthritis of lumbar spine, Parkinson's disease (H), Parkinson's disease dementia (H) neuropsych 2017 (10/13/2017), S/P deep brain stimulator placement, and Sleep apnea.    PSH:  has a past surgical history that includes brain surgery (Right, 12 Jan 2010); Implant  "pulse generator subcutaneous (Right, 17 Feb 2010); Dilation and curettage; Tonsillectomy; Appendectomy open; Septoplasty; Hysterectomy; Repair bladder; Stereotactic insertion deep brain stimulation (Left, 22 jul 2009); Implant pulse generator subcutaneous (Left, 12 aug 2009); Replace Pulse Generator Battery Subcutaneous (10/1/2013); Replace Deep Brain Stimulation Generator / Battery (Right, 11/13/2015); and Replace Deep Brain Stimulation Generator / Battery (Left, 3/2/2018).    FH: family history includes Cerebrovascular Disease in her father and mother; Coronary Artery Disease in her father; Diabetes in her mother and sister; Heart Disease in her mother; Hypertension in her father.    SH:   Social History     Social History Narrative    . Has three children. Lives alone in an apartment building.      reports that  has never smoked. she has never used smokeless tobacco. She reports that she does not drink alcohol or use drugs.    PHYSICAL EXAM:    VITAL SIGNS:  Blood pressure 132/72, pulse 64, temperature 97.8  F (36.6  C), temperature source Oral, resp. rate 16, height 1.702 m (5' 7\"), weight 77.6 kg (171 lb), SpO2 96 %, not currently breastfeeding., Body mass index is 26.78 kg/m .    GENERAL:  Ms. Lazar is a pleasant *** female who is {General:731058327} sitting comfortably in the exam room without any distress.  Affect is appropriate.    MOVEMENT DISORDERS ASSESSMENT: (Last Sinemet was about *** hrs ago)  Speech: Monotone, slurred but understandable; moderately impaired.  Facial Expression: Moderate hypomimia; lips parted some of the time.  Rest Tremor: Slight and infrequently present. and in both hands.    Action/Postural Tremor: Mild postural tremor in Lt; slight in Rt.    Finger Taps: Moderately impaired; early fatiguing; occasional arrests in movement bilaterally.   Hand opening & closing: Mild slowing and/or reduction in amplitude bilaterally.    Hand pronation & supination:  Moderately impaired; " early fatiguing; occasional arrests in movement bilaterally.   Toe Tapping:  Mild slowing and/or reduction in amplitude. and in rt;  Moderately impaired; early fatiguing; occasional arrests in movement. and in Lt.   Leg Agility: Mild slowing and/or reduction in amplitude; occasional arrests in movement bilaterally.   Arising from chair - arms folded across chest: {Arising from chair:4939798}  Posture: {Posture:1662736}  Gait: {Gait:4611320}  Freezing of gait:  ***.   Postural Stability: {Postural Stability:6192801}  Body Bradykinesia: {Body Bradykinesia:8039771}  Dyskinesias:  ***.     DBS Interrogation & Analysis:    Implanted generator:  Bilateral chest Activa-SC.  Lead placement:  Bilateral Subthalamic Nucleus (STN).    Left Brain  Lead placement date:  7/22/2009  Generator placement date:  3/2/2018    C +, 1 -  Voltage: 2.6 volts  PW: 120 msec  Rate: 160 Hz    Therapy impedance:  773 Ohms  Therapy Current:  3.393 mA    Therapy On:  99%  Battery Service Life:  OK.  2.94 volts.    Patient :  No range.     Electrode Impedance Check: (Amplitude 3.0 volts: PW 80 msec: Rate 100 Hz)   Left Lead: No Problems Found.       Right Brain   Lead placement date:  2/17/2010  Generator placement date:  11/13/2015    2 +, 3 -  Voltage: 2.8 volts  PW: 90 msec  Rate: 185 Hz    Therapy impedance:  888 Ohms  Therapy Current:  3.124 mA    Therapy On:  100%  Battery Service Life:  OK.  2.76 volts.    Patient :  No range.     Electrode Impedance Check:  (Amplitude 3.0 volts: PW 80 msec: Rate 100 Hz)   Right Lead: No Problems Found.      See scanned report for impedance details.    ASSESSMENT:    1)  Parkinson's Disease:  Patient has a *** year history of PD.    2)  ***:    3)  ***:    4)  ***:     PLAN:    1)      2)  ***  3)  ***  4)  ***    Will return to our clinic in *** months or sooner as needed.    The total time spent with the patient was *** minutes, and greater than 50% of this time was spent in counseling  and coordination of care.    MIRZA Donnelly,  CNP  [facility]    2:31 PM  3:05 PM      Again, thank you for allowing me to participate in the care of your patient.      Sincerely,    MIRZA Oneill CNP

## 2019-02-18 NOTE — PROGRESS NOTES
"PATIENT: Eduarda Lazar    : 1940    ANDREE: 2019    REASON FOR VISIT:  Parkinson's disease (PD) follow up and deep brain stimulation.     HPI: Ms. Eduarda Lazar is a 78 year old  female who came to the [facility] neurology clinic accompanied by her son for a follow up visit.  She was last seen in the clinic on 11/15/2018 by Dr. Cardoza for a routine f/u visit.      She has been falling.  Since her last visit, she had 3 falls without major injury.  Falls occurred due to getting up by herself.  She is mostly on a wheelchair.  When she walks, she uses a walker and an assistant.     PD Medications 4 am 10 am 3 pm 10 pm PRN   Sinemet 25/100 mg  /2 1 2 2 tab x 2   Sinemet 50/200 mg  1         She reports no change in dizziness.  She has dementia and hard to know if this is accurate.  He son reports that she sometimes complains about dizziness, but not always.  She was on Rivastigmine, which was stopped due to nausea/dizziness.  She is now taking Memantine.      She is at [facility] in [place], [place].  The facility has activities like bingo and cards, but she doesn't participate.  \"I don't like gambling.\"    Son reports that yesterday he got a call from the [facility] that she drunk hand .  Pt denied drinking it.  They called poison control and followed the protocol.      MEDICATIONS:   Medication Sig     acetaminophen (TYLENOL) 325 MG tablet Take 1 tablet (325 mg) by mouth every 4 hours as needed for pain     aspirin 81 MG EC tablet Take 81 mg by mouth as needed for moderate pain     carbidopa-levodopa (SINEMET CR)  MG per CR tablet Take 1 tablet by mouth every 6 hours At 4 am, 10 am, 3 pm, and 10 pm     carbidopa-levodopa (SINEMET)  MG per tablet Take 1/2 tab at 4 am, 1 tablet at 10 am, & 3 pm, and 1/2 tab 10 pm AND 1/2 TAB BY MOUTH TWICE DAILY AS NEEDED AT 4P AND 10PM     Lutein 20 MG TABS 20MG TAB BY MOUTH DAILY @ 8AM     memantine (NAMENDA) 5 MG " "tablet 1 tab daily x 1 week, then 1 tab 2x/day x 1 week, then 2 tabs (10 mg) in the AM and 1 tab (5 mg) in the PM x 1 week, then 2 tabs twice daily     mirtazapine (REMERON) 7.5 MG TABS tablet 7.5MG TAB BY MOUTH NIGHTLY @ 8PM       ALLERGIES: Barbiturates; Camphor; Comtan; Exelon; Oxycodone; Vancomycin; Clindamycin Hcl; and Sulfa drugs    PHYSICAL EXAM:    VITAL SIGNS:  Blood pressure 132/72, pulse 64, temperature 97.8  F (36.6  C), temperature source Oral, resp. rate 16, height 1.702 m (5' 7\"), weight 77.6 kg (171 lb), SpO2 96 %.  Body mass index is 26.78 kg/m .    GENERAL:  Ms. Lazar is a pleasant  female who is well-groomed and well-developed sitting comfortably on a w/c without any distress.  Affect is appropriate.    MOVEMENT DISORDERS ASSESSMENT:   Speech: Monotone, slurred but understandable; moderately impaired.  Facial Expression: Moderate hypomimia; lips parted some of the time.  Rest Tremor: Slight and infrequently present in both hands.    Action/Postural Tremor: Mild postural tremor in Lt; slight in Rt.    Finger Taps: Moderately impaired; early fatiguing; occasional arrests in movement bilaterally.   Hand opening & closing: Mild slowing and reduction in amplitude bilaterally.    Hand pronation & supination:  Moderately impaired; early fatiguing; occasional arrests in movement bilaterally.   Toe Tapping:  Mild slowing and reduction in amplitude in Rt;  Moderately impaired; early fatiguing; occasional arrests in movement in Lt.   Leg Agility: Mild slowing and reduction in amplitude; occasional arrests in movement bilaterally.   Dyskinesias:  Absent.     DBS Interrogation & Analysis:    Implanted generator:  Bilateral chest Activa-SC.  Lead placement:  Bilateral Subthalamic Nucleus (STN).    Left Brain  Lead placement date:  7/22/2009  Generator placement date:  3/2/2018    C +, 1 -  Voltage: 2.6 volts  PW: 120 msec  Rate: 160 Hz    Therapy impedance:  773 Ohms  Therapy Current:  3.393 " mA    Therapy On:  99%  Battery Service Life:  OK.  2.94 volts.    Patient :  No range.     Electrode Impedance Check: (Amplitude 3.0 volts: PW 80 msec: Rate 100 Hz)   Left Lead: No Problems Found.       Right Brain   Lead placement date:  2/17/2010  Generator placement date:  11/13/2015    2 +, 3 -  Voltage: 2.8 volts  PW: 90 msec  Rate: 185 Hz    Therapy impedance:  888 Ohms  Therapy Current:  3.124 mA    Therapy On:  100%  Battery Service Life:  OK.  2.76 volts.    Patient :  No range.     Electrode Impedance Check:  (Amplitude 3.0 volts: PW 80 msec: Rate 100 Hz)   Right Lead: No Problems Found.      See scanned report for impedance details.    ASSESSMENT/PLAN:    1)  Parkinson's Disease:  Patient has a advanced PD.  She is mostly on w/c.  She has had falls due to forgetting to get help to stand up/walk.    __  Will stay on the same antiparkinsonian medications.     2)  Dementia:  She is on Memantine.  There is concern about safety as she is falling and yesterday had an incident that she drunk hand .  She is in care facility and will be closely monitored.       __  Will continue on Memantine.     Will return to our clinic in 3 months or sooner as needed.    The total amount of time spent with the patient was 35 minutes; 20 min in DBS interrogation & analysis and 15 min in addressing PD and dementia.  Over 50% of the time was spent in counseling & coordination of care.      MIRZA Donnelly,  CNP  [facility]    2:31 PM  3:05 PM

## 2019-02-28 ENCOUNTER — AMBULATORY - HEALTHEAST (OUTPATIENT)
Dept: OTHER | Facility: CLINIC | Age: 79
End: 2019-02-28

## 2019-02-28 ENCOUNTER — DOCUMENTATION ONLY (OUTPATIENT)
Dept: OTHER | Facility: CLINIC | Age: 79
End: 2019-02-28

## 2019-05-14 ENCOUNTER — HOSPITAL ENCOUNTER (OUTPATIENT)
Dept: SPEECH THERAPY | Facility: CLINIC | Age: 79
Setting detail: THERAPIES SERIES
End: 2019-05-14
Attending: PSYCHIATRY & NEUROLOGY
Payer: MEDICARE

## 2019-05-14 ENCOUNTER — HOSPITAL ENCOUNTER (OUTPATIENT)
Dept: GENERAL RADIOLOGY | Facility: CLINIC | Age: 79
Discharge: HOME | End: 2019-05-14
Attending: NURSE PRACTITIONER | Admitting: NURSE PRACTITIONER
Payer: MEDICARE

## 2019-05-14 DIAGNOSIS — R13.10 DYSPHAGIA: ICD-10-CM

## 2019-05-14 PROCEDURE — 92526 ORAL FUNCTION THERAPY: CPT | Mod: GN | Performed by: SPEECH-LANGUAGE PATHOLOGIST

## 2019-05-14 PROCEDURE — 74230 X-RAY XM SWLNG FUNCJ C+: CPT

## 2019-05-14 PROCEDURE — 92611 MOTION FLUOROSCOPY/SWALLOW: CPT | Mod: GN | Performed by: SPEECH-LANGUAGE PATHOLOGIST

## 2019-05-14 RX ORDER — BARIUM SULFATE 400 MG/ML
SUSPENSION ORAL ONCE
Status: COMPLETED | OUTPATIENT
Start: 2019-05-14 | End: 2019-05-14

## 2019-05-14 RX ADMIN — BARIUM SULFATE 1 ML: 400 SUSPENSION ORAL at 10:45

## 2019-05-14 NOTE — PROGRESS NOTES
05/14/19 1201       Present No   General Information   Type Of Visit Initial   Start Of Care Date 05/14/19   Referring Physician Belen Low NP   Medical Diagnosis Dysphagia R13.10    Onset Of Illness/injury Or Date Of Surgery   (OP Evaluation on 2/16/2012)   Precautions/limitations Fall Precautions;Swallowing Precautions   Pertinent History of Current Problem/OT: Additional Occupational Profile Info   (deficits noted)   Respiratory Status Room air   Living Environment Snf   Patient/family Goals determine safest, least restrictive diet   VFSS Eval: Thin Liquid Texture Trial   Mode of Presentation, Thin Liquid cup;spoon;straw   Preparatory Phase Poor bolus control   Oral Phase, Thin Liquid Premature pharyngeal entry   Pharyngeal Phase, Thin Liquid Delayed swallow reflex;Residue in valleculae   Rosenbek's Penetration Aspiration Scale: Thin Liquid Trial Results 5 - contrast contacts vocal cords, visible residue remains (penetration)   Response to Aspiration nonproductive volitional cough following clinician cue   Successful Strategies Trialed During Procedure, Thin Liquid chin tuck   Diagnostic Statement deep penetration with residue remaining   VFSS Eval: Nectar Thick Liquid Texture Trial   Mode of Presentation, Nectar cup;spoon;straw;fed by clinician   Preparatory Phase Poor bolus control   Oral Phase, Nectar Premature pharyngeal entry   Pharyngeal Phase, Nectar Delayed swallow reflex;Residue in valleculae   Rosenbek's Penetration Aspiration Scale: Nectar-Thick Liquid Trial Results 5 - contrast contacts vocal cords, visible residue remains (penetration)   Successful Strategies Trialed During Procedure, Nectar chin tuck   Diagnostic Statement chin tuck effective in reducing the depth of penetration with residue remaining   VFSS Eval: Honey Thick Texture Trial   Mode of Presentation, Honey spoon;fed by clinician   Preparatory Phase Poor bolus control   Oral Phase, Honey Residue in oral  cavity;Premature pharyngeal entry   Pharyngeal Phase, Honey Delayed swallow reflex;Residue in valleculae;Residue in pyriform sinus;Pharyngeal wall coating   Rosenbek's Penetration Aspiration Scale: Honey Trial Results 2 - contrast enters airway, remains above the vocal cords, no residue remains (penetration)   Diagnostic Statement mild penetration; significant pharyngeal residue   VFSS Eval: Puree Solid Texture Trial   Mode of Presentation, Puree spoon;fed by clinician   Preparatory Phase Holding;Insufficient mastication;Poor bolus control   Oral Phase, Puree Poor AP movement;Premature pharyngeal entry;Residue in oral cavity   Pharyngeal Phase, Puree Delayed swallow reflex;Residue in valleculae   Rosenbek's Penetration Aspiration Scale: Puree Food Trial Results 1 - no aspiration, contrast does not enter airway   Diagnostic Statement oral deficits; double swallow; mild to moderate residue   VFSS Eval: Semisolid Texture Trial   Mode of Presentation, Semisolid self-fed   Preparatory Phase Holding;Insufficient mastication;Poor bolus control   Oral Phase, Semisolid Poor AP movement;Residue in oral cavity;Premature pharyngeal entry   Pharyngeal Phase, Semisolid Delayed swallow reflex;Residue in valleculae;Pharyngeal wall coating;Residue in pyriform sinus   Rosenbek's Penetration Aspiration Scale: Semisolid Food Trial Results 1 - no aspiration, contrast does not enter airway   Diagnostic Statement oral deficits; double swallow; mild to moderate residue   Swallow Compensations   Swallow Compensations Alternate viscosity of consistencies;Chin tuck;Effortful swallow;Pacing;Reduce amounts;Multiple swallow   Educational Assessment   Barriers to Learning Cognitive   Esophageal Phase of Swallow   Patient reports or presents with symptoms of esophageal dysphagia No   General Therapy Interventions   Planned Therapy Interventions Dysphagia Treatment   Dysphagia treatment Oropharyngeal exercise training;Modified diet  education;Compensatory strategies for swallowing;Instruction of safe swallow strategies   Swallow Eval: Clinical Impressions   Skilled Criteria for Therapy Intervention Skilled criteria met.  Treatment indicated.   Functional Assessment Scale (FAS) 3   Dysphagia Outcome Severity Scale (MARI) Level 3 - MARI   Treatment Diagnosis Moderate oropharyngeal dysphagia   Diet texture recommendations Dysphagia diet level 3;Nectar thick liquids   Recommended Feeding/Eating Techniques alternate between small bites and sips of food/liquid;check mouth frequently for oral residue/pocketing;hard swallow w/ each bite or sip;maintain upright posture during/after eating for 30 mins;small sips/bites;tuck chin during every swallow   Rehab Potential fair, will monitor progress closely   Demonstrates Need for Referral to Another Service dietitian   Therapy Frequency   (per SLP at [facility])   Anticipated Discharge Disposition extended care facility   Risks and Benefits of Treatment have been explained. Yes   Patient, family and/or staff in agreement with Plan of Care Yes   Clinical Impression Comments SLP: Pt presents with moderate oropharyngeal dysphagia on Video Swallow Study. Daughter present and reported that pt has had increasing difficulty the last 1.5 years and has been coughing less with nectar thick liquids and mechanical soft diet. Pt/family denied history of pneumonia, but reported progressive PD symptoms. Deficits on VFSS include: oral holding/delayed initiation, reduced tongue base retraction, incomplete epiglottic inversion, reduced pharyngeal constriction, and poor breath support for throat clear and cough. These deficits resulted in: premature spillage to pyriforms and deep penetration of thin liquids by spoon and cup, continued spillage and penetration with nectars with residue remaining in laryngeal vestibule, no immediate penetration with honey thick liquids, however, severe vallecular residue. Reduced oral phase, piecemeal  deglutition and vallecular residue with puree and semi solids. Chin tuck strategy with liquids was modestly effective in reducing the depth of penetration. Pt/family verbalized agreement with continued mechanical soft diet. Lenghty discussion on liquid thickness. Similar penetration with thin and nectars. Though honey thick liquids did not penetration, suspect that the increased residue and double swallows to clear honey thick liquids would increase aspiration risk. Daughter hesitant to trial thin liquids due to reported coughing episodes over the course of a meal. Based on discussion, would continue nectar thick liquids with chin tuck and consider ice chips between meals following the free water protocol. Daughter verbalized understanding of risk of aspiration and need for continued conversations on safest, least restrictive diet and pt's quality of life. Pt would benefit from continued SLP services.    Swallow Goals   SLP Swallow Goals 1   Swallow Goal 1   Goal Description Pt/family will verbalize understanding and agreement with VFSS results.    Target Date 05/14/19   Date Met 05/14/19   Total Session Time   SLP Eval: VideoFluoroscopic Swallow function Minutes (85429) 30   Therapy Certification   Certification date from 05/14/19   Certification date to 05/14/19   Medical Diagnosis Dysphagia   Certification I certify the need for these services furnished under this plan of treatment and while under my care.  (Physician co-signature of this document indicates review and certification of the therapy plan).

## 2019-05-14 NOTE — PROGRESS NOTES
[facility]          OUTPATIENT SWALLOW  EVALUATION  PLAN OF TREATMENT FOR OUTPATIENT REHABILITATION  (COMPLETE FOR INITIAL CLAIMS ONLY)  Patient's Last Name, First Name, M.I.  YOB: 1940  Eduarda Lazar     Provider's Name   [facility]   Medical Record No.  [id number]     Start of Care Date:  05/14/19   Onset Date:  (OP Evaluation on 2/16/2012)   Type:     ___PT   ____OT  ___X_SLP Medical Diagnosis:  Dysphagia     Treatment Diagnosis:  Moderate oropharyngeal dysphagia Visits from SOC:  1     _________________________________________________________________________________  Plan of Treatment/Functional Goals:  Planned Therapy Interventions: Dysphagia Treatment  Dysphagia treatment: Oropharyngeal exercise training, Modified diet education, Compensatory strategies for swallowing, Instruction of safe swallow strategies                     Goals   1.         Goal Description: Pt/family will verbalize understanding and agreement with VFSS results.        Target Date: 05/14/19       Date Met: 05/14/19   2.                             3.                             4.                             5.                            6.                              7.                              8.                              Therapy Frequency: (per SLP at )       EPIFANIO Ly        I CERTIFY THE NEED FOR THESE SERVICES FURNISHED UNDER        THIS PLAN OF TREATMENT AND WHILE UNDER MY CARE     (Physician co-signature of this document indicates review and certification of the therapy plan).                  Certification date from: 05/14/19 Certification date to: 05/14/19          Referring Physician: Belen Low NP    Initial Assessment        See Epic Evaluation Start Of Care Date: 05/14/19

## 2019-05-15 ENCOUNTER — RECORDS - HEALTHEAST (OUTPATIENT)
Dept: LAB | Facility: CLINIC | Age: 79
End: 2019-05-15

## 2019-05-16 LAB
ANION GAP SERPL CALCULATED.3IONS-SCNC: 9 MMOL/L (ref 5–18)
BUN SERPL-MCNC: 14 MG/DL (ref 8–28)
CALCIUM SERPL-MCNC: 8.8 MG/DL (ref 8.5–10.5)
CHLORIDE BLD-SCNC: 105 MMOL/L (ref 98–107)
CO2 SERPL-SCNC: 29 MMOL/L (ref 22–31)
CREAT SERPL-MCNC: 0.89 MG/DL (ref 0.6–1.1)
GFR SERPL CREATININE-BSD FRML MDRD: >60 ML/MIN/1.73M2
GLUCOSE BLD-MCNC: 63 MG/DL (ref 70–125)
HGB BLD-MCNC: 13 G/DL (ref 12–16)
POTASSIUM BLD-SCNC: 3.9 MMOL/L (ref 3.5–5)
SODIUM SERPL-SCNC: 143 MMOL/L (ref 136–145)

## 2019-05-17 RX ORDER — CARBIDOPA AND LEVODOPA 50; 200 MG/1; MG/1
TABLET, EXTENDED RELEASE ORAL
Qty: 360 TABLET | Refills: 3 | COMMUNITY
Start: 2019-05-17 | End: 2019-05-23

## 2019-05-17 RX ORDER — MEMANTINE HYDROCHLORIDE 10 MG/1
TABLET ORAL
Refills: 99 | COMMUNITY
Start: 2019-05-02 | End: 2019-05-23

## 2019-05-17 RX ORDER — ASPIRIN 81 MG
TABLET, DELAYED RELEASE (ENTERIC COATED) ORAL
Refills: 99 | COMMUNITY
Start: 2019-03-14 | End: 2019-05-23
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
## 2019-05-21 RX ORDER — MEMANTINE HYDROCHLORIDE 5 MG/1
TABLET ORAL
Refills: 11 | COMMUNITY
Start: 2019-01-07 | End: 2019-05-23

## 2019-05-23 ENCOUNTER — OFFICE VISIT (OUTPATIENT)
Dept: NEUROLOGY | Facility: CLINIC | Age: 79
End: 2019-05-23
Payer: MEDICARE

## 2019-05-23 VITALS
HEART RATE: 77 BPM | TEMPERATURE: 97.5 F | DIASTOLIC BLOOD PRESSURE: 61 MMHG | SYSTOLIC BLOOD PRESSURE: 97 MMHG | OXYGEN SATURATION: 95 %

## 2019-05-23 DIAGNOSIS — G20.A1 PARKINSON DISEASE (H): ICD-10-CM

## 2019-05-23 DIAGNOSIS — G20.A1 PARKINSON'S DISEASE (H): Primary | ICD-10-CM

## 2019-05-23 DIAGNOSIS — Z98.890 S/P BRAIN SURGERY: ICD-10-CM

## 2019-05-23 RX ORDER — MEMANTINE HYDROCHLORIDE 5 MG/1
TABLET ORAL
Qty: 360 TABLET | Refills: 3 | COMMUNITY
Start: 2019-05-23 | End: 2019-05-23

## 2019-05-23 RX ORDER — CARBIDOPA AND LEVODOPA 25; 100 MG/1; MG/1
TABLET ORAL
Qty: 270 TABLET | Refills: 3 | Status: SHIPPED | OUTPATIENT
Start: 2019-05-23 | End: 2020-05-12

## 2019-05-23 RX ORDER — MEMANTINE HYDROCHLORIDE 10 MG/1
TABLET ORAL
Qty: 90 TABLET | Refills: 3 | Status: SHIPPED | OUTPATIENT
Start: 2019-05-23

## 2019-05-23 RX ORDER — ACETAMINOPHEN 325 MG/1
650 TABLET ORAL EVERY 4 HOURS PRN
Qty: 100 TABLET | COMMUNITY
Start: 2019-05-23 | End: 2020-05-12

## 2019-05-23 RX ORDER — CARBIDOPA AND LEVODOPA 50; 200 MG/1; MG/1
TABLET, EXTENDED RELEASE ORAL
Qty: 360 TABLET | Refills: 3 | Status: SHIPPED | OUTPATIENT
Start: 2019-05-23 | End: 2020-01-01

## 2019-05-23 ASSESSMENT — PAIN SCALES - GENERAL: PAINLEVEL: NO PAIN (0)
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
## 2019-05-23 NOTE — NURSING NOTE
Chief Complaint   Patient presents with     RECHECK     [facility] RETURN MOVEMENT DISORDER F/U PER TESEGA     Family reports no change to medications.    Nellie Aaron, EMT

## 2019-05-24 ENCOUNTER — ALLIED HEALTH/NURSE VISIT (OUTPATIENT)
Dept: NEUROLOGY | Facility: CLINIC | Age: 79
End: 2019-05-24

## 2019-05-24 ENCOUNTER — TELEPHONE (OUTPATIENT)
Dept: NEUROLOGY | Facility: CLINIC | Age: 79
End: 2019-05-24

## 2019-05-24 DIAGNOSIS — G20.A1 PARKINSON'S DISEASE (H): Primary | ICD-10-CM

## 2019-05-24 NOTE — TELEPHONE ENCOUNTER
"2 remotes October 1, 2013.    1.  Connect the cable to your    2.  Place one end of the cable to your chest directly over the DBS generator   3.  Push the small white button to turn ON your patient controller   4.  When you see the check ana on the screen, push the orange check button -- if you don't see the check ana, adjust the cable to make use that it's connecting with your generator & push the orange button.  Do this as many times as you need until your controller & generator are connected.     5.  When it's connected, it will say \"ON\" & \"OK\"   6.  To turn your DBS OFF or ON, push the big white button   7.  Once you're done, push the small white button to turn OFF your patient controller. This will NOT turn your DBS off!  It'll just turn off your controller.     "

## 2019-05-24 NOTE — PROGRESS NOTES
On 5/23/19 Eduarda comes into clinic today at the request of Dr. Cardoza, ordering provider for follow-up visit to further evaluate her bilateral Deep Brain Stimulation systems. This service provided today was under the supervising provider of the day Dr. Cardoza, who was available if needed.     Reason for visit: DEEP BRAIN STIMULATION Interrogation   Time spent on visit: 15 minutes    Sarah Roldan RN    Movement Disorder Care Coordinator  [facility]    Interrogated patient's DBS Bilateral Activa SC batteries. Patient has Bilateral STN. All impedances were checked at 3.0 Volts and were within normal limits. See neuromodulation sheets scanned. Patient informed.    LSTN Therapy impedance = 776 ohms, 3.4 mA    Contacts used = C+/1-   (monopolar)  Current Setting = 2.6 V, 120 us, 160 Hz    Model 76454    Battery voltage 2.94    RSTN Therapy impedance = 892 ohms, 3.1 mA    Contacts used = 2+/3-   (bipolar)  Current Setting = 2.8 V, 90 us, 185 hz    Model 60172    Battery voltage 2.63  ---------------------------------------------------------------------------------------------------------    This battery is nearing end of life. Patient and son were told to turn off stimulation on this side for 24 hours to see if it is really making that much of a difference. There is risk involved in a surgical procedure to replace it and if it is not providing much benefit due to her Parkinson's disease progression, then perhaps it is not worth the risk to replace.    5/24/19:  Spoke with patient's daughter Aixa. She was at the patient's long term care facility. I talked her through turning off the right generator. She will see how the patient does for 24 hours and turn it back on tomorrow. I will call her next Friday (due to her being out of town) to see what the family has decided about replacement.    Mailed Aixa caregiver support materials.    Aixa had no further questions or concerns.

## 2019-09-07 ENCOUNTER — RECORDS - HEALTHEAST (OUTPATIENT)
Dept: LAB | Facility: CLINIC | Age: 79
End: 2019-09-07

## 2019-09-09 LAB
ANION GAP SERPL CALCULATED.3IONS-SCNC: 5 MMOL/L (ref 5–18)
BUN SERPL-MCNC: 21 MG/DL (ref 8–28)
CALCIUM SERPL-MCNC: 9 MG/DL (ref 8.5–10.5)
CHLORIDE BLD-SCNC: 106 MMOL/L (ref 98–107)
CO2 SERPL-SCNC: 31 MMOL/L (ref 22–31)
CREAT SERPL-MCNC: 0.94 MG/DL (ref 0.6–1.1)
GFR SERPL CREATININE-BSD FRML MDRD: 57 ML/MIN/1.73M2
GLUCOSE BLD-MCNC: 81 MG/DL (ref 70–125)
POTASSIUM BLD-SCNC: 4.3 MMOL/L (ref 3.5–5)
SODIUM SERPL-SCNC: 142 MMOL/L (ref 136–145)

## 2019-09-12 ENCOUNTER — TELEPHONE (OUTPATIENT)
Dept: NEUROLOGY | Facility: CLINIC | Age: 79
End: 2019-09-12

## 2019-09-12 NOTE — TELEPHONE ENCOUNTER
Patient has bilateral STN  DBS with Activa SC batteries model 41682.     Received an in-coming call from Dr. Leahy, surgeon at [facility]. Patient is at their facility having cataract surgery but the family forgot to bring the patient . I told him that his staff should have contacted [employer] before she was scheduled to find out the exact precautions and instructions to plan for with a patient with Deep Brain Stimulation. I stated that they will not be able to proceed without the ability to turn her system off and that there are cautery guidelines as well.     They will cancel the surgery and reschedule.

## 2019-09-18 ENCOUNTER — TELEPHONE (OUTPATIENT)
Dept: NEUROLOGY | Facility: CLINIC | Age: 79
End: 2019-09-18

## 2019-09-18 NOTE — TELEPHONE ENCOUNTER
[facility] Call Center    Phone Message    May a detailed message be left on voicemail: yes    Reason for Call: Other: Pt called in , pt is having surgery next week and they need to turn off the battery packs off next week, i couldnt get clarification as we are having phone problems, please call back for clarification      Action Taken: Message routed to:  [facility] neuro

## 2019-09-19 NOTE — TELEPHONE ENCOUNTER
Returned Aixa's call. Patient is scheduled for 9/26 for the next eye surgery. The previous surgery was aborted because the patient did not have her  with her to turn off her Deep Brain Stimulation systems.    Patient was seen in our clinic on 5/24/19. At that time her Right battery Activa SC was at 2.63 and they were not sure they wanted to put the patient through another battery replacement as she would have to go under general anesthesia. They were also unsure as to how much benefit patient was receiving from the Deep Brain Stimulation. The Left battery was at 2.94 so fully functional.    Aixa will go to her mother's facility at 11:00 am today with the patient . I will walk her through checking the battery life and how to turn the systems on and off.

## 2019-09-19 NOTE — TELEPHONE ENCOUNTER
Called Aixa to go over the patient . She was able to turn off and on the left chest battery and read the battery life. The right chest battery showed ONI and OFF. It is no longer on or working. Aixa states there really isn't any big change that they have noticed in her left side symptoms so they will not be pursuing a replacement battery for that side.

## 2019-09-29 ENCOUNTER — HEALTH MAINTENANCE LETTER (OUTPATIENT)
Age: 79
End: 2019-09-29

## 2019-12-26 ENCOUNTER — TELEPHONE (OUTPATIENT)
Dept: PHARMACY | Facility: CLINIC | Age: 79
End: 2019-12-26

## 2019-12-26 NOTE — TELEPHONE ENCOUNTER
Received message from MTM coordinators that daughter, Aixa, would like to speak with me about future MTM visits.     Patient hasn't been seen in neurology since May 2019. I discussed with daughter that the patient would benefit from a follow up visit with Dr. Cardoza or Margarita in neurology and she will make an appointment. We will not have a telephone MTM visit at this time as the patient has dementia and has difficulty discussing her symptoms.     Of note, Aixa states that one of her DBS batteries does not work and they are not planning to replace it at this time. It doesn't seem her tremor has significantly worsened.     July Quan, Pharm.D.  Medication Therapy Management Pharmacist  Phone: [phone]

## 2020-01-01 ENCOUNTER — DOCUMENTATION ONLY (OUTPATIENT)
Dept: NEUROLOGY | Facility: CLINIC | Age: 80
End: 2020-01-01

## 2020-01-01 ENCOUNTER — RECORDS - HEALTHEAST (OUTPATIENT)
Dept: LAB | Facility: CLINIC | Age: 80
End: 2020-01-01

## 2020-01-01 ENCOUNTER — TELEPHONE (OUTPATIENT)
Dept: NEUROLOGY | Facility: CLINIC | Age: 80
End: 2020-01-01

## 2020-01-01 ENCOUNTER — VIRTUAL VISIT (OUTPATIENT)
Dept: NEUROLOGY | Facility: CLINIC | Age: 80
End: 2020-01-01
Payer: MEDICARE

## 2020-01-01 ENCOUNTER — MEDICAL CORRESPONDENCE (OUTPATIENT)
Dept: HEALTH INFORMATION MANAGEMENT | Facility: CLINIC | Age: 80
End: 2020-01-01

## 2020-01-01 DIAGNOSIS — G20.A1 PARKINSON'S DISEASE (H): Primary | ICD-10-CM

## 2020-01-01 DIAGNOSIS — G20.A1 PARKINSON'S DISEASE (H): ICD-10-CM

## 2020-01-01 DIAGNOSIS — G20.A1 PARKINSON DISEASE (H): Primary | ICD-10-CM

## 2020-01-01 DIAGNOSIS — G20.A1 PARKINSON DISEASE (H): ICD-10-CM

## 2020-01-01 LAB
ANION GAP SERPL CALCULATED.3IONS-SCNC: 7 MMOL/L (ref 5–18)
BUN SERPL-MCNC: 14 MG/DL (ref 8–28)
CALCIUM SERPL-MCNC: 8.1 MG/DL (ref 8.5–10.5)
CHLORIDE BLD-SCNC: 104 MMOL/L (ref 98–107)
CO2 SERPL-SCNC: 29 MMOL/L (ref 22–31)
CREAT SERPL-MCNC: 0.83 MG/DL (ref 0.6–1.1)
ERYTHROCYTE [DISTWIDTH] IN BLOOD BY AUTOMATED COUNT: 13.1 % (ref 11–14.5)
GFR SERPL CREATININE-BSD FRML MDRD: >60 ML/MIN/1.73M2
GLUCOSE BLD-MCNC: 66 MG/DL (ref 70–125)
HCT VFR BLD AUTO: 39.5 % (ref 35–47)
HGB BLD-MCNC: 12.6 G/DL (ref 12–16)
MCH RBC QN AUTO: 31.9 PG (ref 27–34)
MCHC RBC AUTO-ENTMCNC: 31.9 G/DL (ref 32–36)
MCV RBC AUTO: 100 FL (ref 80–100)
PLATELET # BLD AUTO: 156 THOU/UL (ref 140–440)
PMV BLD AUTO: 10 FL (ref 8.5–12.5)
POTASSIUM BLD-SCNC: 3.9 MMOL/L (ref 3.5–5)
RBC # BLD AUTO: 3.95 MILL/UL (ref 3.8–5.4)
SODIUM SERPL-SCNC: 140 MMOL/L (ref 136–145)
WBC: 6.8 THOU/UL (ref 4–11)

## 2020-01-01 PROCEDURE — 99214 OFFICE O/P EST MOD 30 MIN: CPT | Mod: 95 | Performed by: PSYCHIATRY & NEUROLOGY

## 2020-01-01 PROCEDURE — 99207 PR NO BILLABLE SERVICE THIS VISIT: CPT | Performed by: PSYCHIATRY & NEUROLOGY

## 2020-01-01 RX ORDER — POLYETHYLENE GLYCOL 3350 17 G/17G
POWDER, FOR SOLUTION ORAL
COMMUNITY
Start: 2020-01-01 | End: 2020-01-01

## 2020-01-01 RX ORDER — SENNOSIDES A AND B 8.6 MG/1
TABLET, FILM COATED ORAL
COMMUNITY
End: 2020-01-01

## 2020-01-01 RX ORDER — CARBOXYMETHYLCELLULOSE SODIUM 10 MG/ML
GEL OPHTHALMIC
COMMUNITY
Start: 2020-01-01 | End: 2020-01-01

## 2020-01-01 RX ORDER — VIT C/E/ZN/COPPR/LUTEIN/ZEAXAN 60 MG-6 MG
1 CAPSULE ORAL DAILY
COMMUNITY
Start: 2020-01-01 | End: 2020-01-01

## 2020-01-01 RX ORDER — CARBIDOPA AND LEVODOPA 25; 100 MG/1; MG/1
TABLET ORAL
Qty: 180 TABLET | Refills: 3 | Status: SHIPPED | OUTPATIENT
Start: 2020-01-01 | End: 2020-01-01

## 2020-01-01 RX ORDER — POLYETHYLENE GLYCOL 3350 17 G/17G
POWDER, FOR SOLUTION ORAL
COMMUNITY
Start: 2020-01-01

## 2020-01-01 RX ORDER — CARBIDOPA AND LEVODOPA 25; 100 MG/1; MG/1
TABLET ORAL
Qty: 180 TABLET | Refills: 3 | COMMUNITY
Start: 2020-01-01

## 2020-01-01 RX ORDER — CARBOXYMETHYLCELLULOSE SODIUM 10 MG/ML
GEL OPHTHALMIC
COMMUNITY
Start: 2020-01-01

## 2020-01-01 RX ORDER — LANOLIN ALCOHOL/MO/W.PET/CERES
CREAM (GRAM) TOPICAL
COMMUNITY
Start: 2020-01-01

## 2020-01-01 RX ORDER — SENNOSIDES 8.6 MG
TABLET ORAL
COMMUNITY
Start: 2020-01-01

## 2020-01-01 RX ORDER — CARBIDOPA AND LEVODOPA 50; 200 MG/1; MG/1
TABLET, EXTENDED RELEASE ORAL
Qty: 360 TABLET | Refills: 3 | COMMUNITY
Start: 2020-01-01 | End: 2020-01-01

## 2020-01-01 RX ORDER — CARBOXYMETHYLCELLULOSE SODIUM 10 MG/ML
1 SOLUTION/ DROPS OPHTHALMIC 3 TIMES DAILY
COMMUNITY
Start: 2020-01-01 | End: 2020-01-01

## 2020-01-01 RX ORDER — CARBIDOPA AND LEVODOPA 25; 100 MG/1; MG/1
TABLET ORAL
Qty: 270 TABLET | Refills: 3 | Status: SHIPPED | OUTPATIENT
Start: 2020-01-01 | End: 2020-01-01

## 2020-01-01 RX ORDER — CARBIDOPA AND LEVODOPA 50; 200 MG/1; MG/1
TABLET, EXTENDED RELEASE ORAL
Qty: 360 TABLET | Refills: 3 | COMMUNITY
Start: 2020-01-01

## 2020-03-15 ENCOUNTER — HEALTH MAINTENANCE LETTER (OUTPATIENT)
Age: 80
End: 2020-03-15

## 2020-05-06 RX ORDER — MIRTAZAPINE 15 MG/1
TABLET, FILM COATED ORAL
COMMUNITY
Start: 2019-12-03 | End: 2020-05-12

## 2020-05-06 RX ORDER — POLYETHYLENE GLYCOL 400 AND PROPYLENE GLYCOL 4; 3 MG/ML; MG/ML
SOLUTION/ DROPS OPHTHALMIC
COMMUNITY
Start: 2019-12-06 | End: 2020-01-01

## 2020-05-06 RX ORDER — SENNOSIDES 8.6 MG
TABLET ORAL
COMMUNITY
Start: 2019-12-03 | End: 2020-05-12

## 2020-05-06 RX ORDER — VIT C/E/ZN/COPPR/LUTEIN/ZEAXAN 60 MG-6 MG
1 CAPSULE ORAL
COMMUNITY
Start: 2019-12-06 | End: 2020-01-01
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
## 2020-05-12 ENCOUNTER — VIRTUAL VISIT (OUTPATIENT)
Dept: NEUROLOGY | Facility: CLINIC | Age: 80
End: 2020-05-12
Payer: MEDICARE

## 2020-05-12 DIAGNOSIS — Z98.890 S/P BRAIN SURGERY: Primary | ICD-10-CM

## 2020-05-12 DIAGNOSIS — G20.A1 PARKINSON'S DISEASE (H): ICD-10-CM

## 2020-05-12 RX ORDER — MIRTAZAPINE 30 MG/1
TABLET, FILM COATED ORAL
COMMUNITY
Start: 2020-05-12

## 2020-05-12 RX ORDER — LANOLIN ALCOHOL/MO/W.PET/CERES
CREAM (GRAM) TOPICAL
COMMUNITY
Start: 2020-01-28 | End: 2020-01-01

## 2020-05-12 RX ORDER — SENNOSIDES 8.6 MG
TABLET ORAL
COMMUNITY
Start: 2020-05-12 | End: 2020-01-01

## 2020-05-12 RX ORDER — CARBIDOPA AND LEVODOPA 25; 100 MG/1; MG/1
TABLET ORAL
Qty: 270 TABLET | Refills: 3 | COMMUNITY
Start: 2020-05-12 | End: 2020-01-01

## 2020-05-12 NOTE — LETTER
"[facility]  [facility] NEUROLOGY  [address] [address]  [phone]            May 12, 2020          Dear Sole Proxy Patient,    We received a request to activate you as a proxy for another patient of [facility] or [facility]. In order to do so, we need to activate your [employer] account as well.    Your access code is: [unfilled]       Please access the [employer] website:  -  [employer]: [email or web address]  -  [employer] [email or web address].    Below the ID and password fields, select the \"Sign Up Now\" as New User.  You will be prompted to enter the access code listed above as well as additional personal information.  Please follow the directions carefully when creating your username and password.    Once your account is activated, you can access the proxy accounts under \"Shared Medical Records\".    If you allow your access code to , or if you have any questions please call [employer] support at [phone] during normal clinic hours.     Sincerely,        [employer] Customer Service    "

## 2020-05-12 NOTE — PROGRESS NOTES
"Eduarda Lazar is a 80 year old female who is being evaluated via a billable telephone visit.      The patient has been notified of following:     \"This telephone visit will be conducted via a call between you and your physician/provider. We have found that certain health care needs can be provided without the need for a physical exam.  This service lets us provide the care you need with a short phone conversation.  If a prescription is necessary we can send it directly to your pharmacy.  If lab work is needed we can place an order for that and you can then stop by our lab to have the test done at a later time.    Telephone visits are billed at different rates depending on your insurance coverage. During this emergency period, for some insurers they may be billed the same as an in-person visit.  Please reach out to your insurance provider with any questions.    If during the course of the call the physician/provider feels a telephone visit is not appropriate, you will not be charged for this service.\"    Patient has given verbal consent for Telephone visit?  Yes    What phone number would you like to be contacted at?     How would you like to obtain your AVS? Mail a copy    Phone call duration: 25 minutes    Hilario Fofana EMT         "
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
## 2020-08-13 NOTE — TELEPHONE ENCOUNTER
Health Call Center    Phone Message    May a detailed message be left on voicemail: yes     Reason for Call: Other: Marie calling with questions regarding an extended relief medication that Rayo is taking. Marie stated that Rayo is having more difficulty in swallowing her medications and is now chewing it. You may fax new orders to [phone].     Action Taken: Message routed to:  [facility] ([facility]):  NEUROLOGY    Travel Screening: Not Applicable

## 2020-08-13 NOTE — TELEPHONE ENCOUNTER
Rayo will see Dr. Cardoza on Tuesday August 18th at 3:00 PM. They can use a tablet for the video visit and Marie can help Rayo with this. The link should be sent to:  [email or web address]  [phone]

## 2020-08-14 PROBLEM — H25.13 NUCLEAR SCLEROTIC CATARACT, BILATERAL: Status: ACTIVE | Noted: 2019-07-17
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
## 2020-08-18 NOTE — PATIENT INSTRUCTIONS
"Assessment:  (G20) Parkinson's disease (H)  (primary encounter diagnosis)    Z98.890) S/P brain surgery  (primary encounter diagnosis)  Parkinson  - bilateral STN DBS     May 4 - video visit with primary who was wondering if they were \"dyskinesias\"     Last seen by me 5/2019  Her battery - right side - was not replaced  Left side was low.      She is walking slowly - but still ambulating. Not clear if the dyskinesias are affecting her gait.     Medications     4AM 5AM 8AM 10AM 12 NOON 1PM 3PM 4PM 8PM 9PM 10PM                               CARBIDOPA/LEVODOPA CR 50/200 1     1     1       1   CARBIDOPA/LEVODOPA 25/100 1/2     1     1       1/2   CARBIDOPA/LEVODOPA 25/100   1/2 TAB 2/DAY AS NEEDED  As needed                        Carboxymethylcellulose artificial tears                         Melatonin 3mg         1     Memantine namenda 10mg     1           1       MIRTAZAPINE REMERON 30mg                 1       MVI with ocuvite     1                   nonformulary nectar thickener                         Polyethylene glycol miralax  1 cap in liquid            Rivastigmine exelon 1.5mg              Sennosides senokot 8.6   1             2       Systane ultra PF 0.4-0.3% ophthalmic solution                                                                      Plan:  Possibly stop the ocuvite as it may not be providing benefit.     If she has problems swallowing the miralax in a liquid may need to put the powder in food    In regards to rivastigmine  - he had been on the patch (had itching) and pills (and had nausea). Consider retrying at a low dose for cognitive/memory function but due to gi side effects probably won't try the rivastigmine orally. She will continue on the namenda.    Talked about the long acting and it is okay to crush the 50/200 as she is chewing it anyway.   Continue the short acting sinemet 25/100 now.     Discussed other options such as using 25/250 sinemet 4/day  instead of long acting 50/200 and " short acting 25/100

## 2020-08-18 NOTE — PROGRESS NOTES
"Eduarda Lazar is a 80 year old female who is being evaluated via a billable video visit.      The patient has been notified of following:     \"This video visit will be conducted via a call between you and your physician/provider. We have found that certain health care needs can be provided without the need for an in-person physical exam.  This service lets us provide the care you need with a video conversation.  If a prescription is necessary we can send it directly to your pharmacy.  If lab work is needed we can place an order for that and you can then stop by our lab to have the test done at a later time.    Video visits are billed at different rates depending on your insurance coverage.  Please reach out to your insurance provider with any questions.    If during the course of the call the physician/provider feels a video visit is not appropriate, you will not be charged for this service.\"    Patient has given verbal consent for Video visit? YES  How would you like to obtain your AVS? mychart  If you are dropped from the video visit, the video invite should be resent to:   Will anyone else be joining your video visit?         Video-Visit Details    Type of service:  Video Visit    Video Start Time: 0  Video End Time: 0    Originating Location (pt. Location): Other -    Distant Location (provider location):  [facility] NEUROLOGY     Platform used for Video Visit: Other: - [email or web address]    Jimy Cain EMT  "

## 2020-08-19 NOTE — PROGRESS NOTES
Received form from [facility], form was signed by provider and fax to [phone], sent to be scanned
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
## 2020-10-01 NOTE — LETTER
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 Glycol 3350 (MIRALAX PO) In liquid by mouth every morning       REFRESH CELLUVISC 1 % ophthalmic gel INSTILL 1 DROP INTO BOTH EYES THREE TIMES DAILY       sennosides (SENOKOT) 8.6 MG tablet 1 tab in am and 2 tabs in pm       SYSTANE ULTRA PF 0.4-0.3 % SOLN opthalmic solution PLACE 1-2 DROPS INTO EACH EYE THREE TIMES DAILY                           Marcelo Cardoza MD
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
## 2020-10-01 NOTE — TELEPHONE ENCOUNTER
Obdulia [facility] reported she is using google [employer] on the Ipad but [employer] is telling her that her browser is not supported. She tried connecting with a laptop and nothing happens when she clicks begin visit. She is going to try another laptop and asks that I call her back in 5 minutes to touch base.    Obdulia reported they will receive a text link for the video visit. Appointment notes updated by Hilario Fofana

## 2020-10-01 NOTE — PATIENT INSTRUCTIONS
Keep the SINEMET CR the same  Discontinue the PRN sinemet 25/100 short acting  Change the sinemet 25/100 to 1/2 tab 4/day Because of her dyskinesias (which are not troublesome to her) and she has some hallucinations    Remain on namenda for now.         Medications     4AM 5AM 8AM 10AM 12 NOON 1PM 3PM 4PM 8PM 9PM 10PM                                                       CARBIDOPA/LEVODOPA CR 50/200 1     1     1       1   CARBIDOPA/LEVODOPA 25/100 1/2     1/2     1/2       1/2   Carboxymethylcellulose artificial tears                         Melatonin 3mg                 1       Memantine namenda 10mg     1           1       MIRTAZAPINE REMERON 30mg                 1       MVI with ocuvite     Off this                   nonformulary nectar thickener                         Polyethylene glycol miralax   1 cap in liquid                     Refresh celluvisc 1% ophthalmic gel              Sennosides senokot 8.6   1             2       SM clearlax 17mg   See above            Systane ultra PF 0.4-0.3% ophthalmic solution

## 2020-10-01 NOTE — TELEPHONE ENCOUNTER
[facility] Call Center    Phone Message    May a detailed message be left on voicemail: yes     Reason for Call: Other: Obdulia calling in regards to patients appointment today 10/1 stating that they can not finish the e-check in process because the patient is unable to sign for herself on Answer[employer]Tot they are wondering if the start video button will still pop up without finishing the e-check in process. Stated they called [employer] support and were sent over to us.     Please call back asap to discuss    Action Taken: Other: [facility]     Travel Screening: Not Applicable

## 2020-10-01 NOTE — LETTER
October 1, 2020       TO: Eduarda Lazar  [address] [facility] [address]       DearMs.Nagi,    We are writing to inform you of your test results.    {[facility] results letter list:401200}    No results found from the In Basket message.    ***

## 2020-10-01 NOTE — PROGRESS NOTES
"Eduarda Lazar is a 80 year old female who is being evaluated via a billable video visit.      The patient has been notified of following:     \"This video visit will be conducted via a call between you and your physician/provider. We have found that certain health care needs can be provided without the need for an in-person physical exam.  This service lets us provide the care you need with a video conversation.  If a prescription is necessary we can send it directly to your pharmacy.  If lab work is needed we can place an order for that and you can then stop by our lab to have the test done at a later time.    Video visits are billed at different rates depending on your insurance coverage.  Please reach out to your insurance provider with any questions.    If during the course of the call the physician/provider feels a video visit is not appropriate, you will not be charged for this service.\"    Patient has given verbal consent for Video visit? yes  How would you like to obtain your AVS? mychart  If you are dropped from the video visit, the video invite should be resent to: [facility]   Will anyone else be joining your video visit? [phone]        Video-Visit Details    Type of service:  Video Visit    Distant Location (provider location):  [facility] NEUROLOGY [facility]     Platform used for Video Visit: Shannen Fofana, EMT        "
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
## 2020-12-03 NOTE — PROGRESS NOTES
"Eduarda Lazar is a 80 year old female who is being evaluated via a billable video visit.      The patient has been notified of following:     \"This video visit will be conducted via a call between you and your physician/provider. We have found that certain health care needs can be provided without the need for an in-person physical exam.  This service lets us provide the care you need with a video conversation.  If a prescription is necessary we can send it directly to your pharmacy.  If lab work is needed we can place an order for that and you can then stop by our lab to have the test done at a later time.    Video visits are billed at different rates depending on your insurance coverage.  Please reach out to your insurance provider with any questions.    If during the course of the call the physician/provider feels a video visit is not appropriate, you will not be charged for this service.\"    Patient has given verbal consent for Video visit? yes  How would you like to obtain your AVS? MyChart  If you are dropped from the video visit, the video invite should be resent to: [email or web address]  Will anyone else be joining your video visit? Dr. Livia Renteria        Video-Visit Details    Type of service:  Video Visit    Video Start Time:   Video End Time:     Originating Location (pt. Location): Other facility ?    Distant Location (provider location):  [facility] NEUROLOGY [facility]     Platform used for Video Visit: Nathalie Fofana, EMT        "

## 2020-12-08 PROBLEM — Z71.89 ENCOUNTER FOR HOSPICE CARE DISCUSSION: Status: ACTIVE | Noted: 2020-01-01

## 2020-12-08 PROBLEM — R41.89 COGNITIVE IMPAIRMENT: Status: ACTIVE | Noted: 2020-01-01

## 2020-12-08 PROBLEM — F32.A DEPRESSION: Status: ACTIVE | Noted: 2020-01-01

## 2020-12-08 PROBLEM — R63.4 UNINTENTIONAL WEIGHT LOSS: Status: ACTIVE | Noted: 2020-01-01
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
## 2020-12-15 NOTE — LETTER
Date:December 18, 2020      Patient was self referred, no letter generated. Do not send.        [facility] Information

## 2020-12-15 NOTE — PROGRESS NOTES
"Eduarda Lazar is a 80 year old female who is being evaluated via a billable video visit.      The patient has been notified of following:     \"This video visit will be conducted via a call between you and your physician/provider. We have found that certain health care needs can be provided without the need for an in-person physical exam.  This service lets us provide the care you need with a video conversation.  If a prescription is necessary we can send it directly to your pharmacy.  If lab work is needed we can place an order for that and you can then stop by our lab to have the test done at a later time.    Video visits are billed at different rates depending on your insurance coverage.  Please reach out to your insurance provider with any questions.    If during the course of the call the physician/provider feels a video visit is not appropriate, you will not be charged for this service.\"    Patient has given verbal consent for Video visit? Yes  How would you like to obtain your AVS? MyChart  If you are dropped from the video visit, the video invite should be resent to: Send to e-mail at:  [email or web address]  Will anyone else be joining your video visit? No        Video-Visit Details    Type of service:  Video Visit    Video Start Time:   Video End Time:     Originating Location (pt. Location):     Distant Location (provider location):  [facility] NEUROLOGY [facility]     Platform used for Video Visit: Giana Hicks      "
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
## 2021-01-01 ENCOUNTER — HEALTH MAINTENANCE LETTER (OUTPATIENT)
Age: 81
End: 2021-01-01

## 2021-01-01 ENCOUNTER — TELEPHONE (OUTPATIENT)
Dept: NEUROLOGY | Facility: CLINIC | Age: 81
End: 2021-01-01

## 2021-01-12 NOTE — TELEPHONE ENCOUNTER
[facility] Call Center    Phone Message    May a detailed message be left on voicemail: yes     Reason for Call: Other: Dr. Livia Renteria called to talk to Dr. Terrell about tapering Pt off her medications due to her being on Hospice.    She requests call back from Dr. Cardoza at [phone].    Action Taken: Message routed to:  [facility] Neurology    Travel Screening: Not Applicable

## 2021-01-13 NOTE — TELEPHONE ENCOUNTER
Dr. Matos's reported that Jeans daughter does not want Dr. Matos's to decrease Jeans Parkinson's medications unless Dr. Cardoza instructs to do so. She reports Rayo is having lots of dyskinesia's and has difficulty swallowing so they have been crushing her carbidopa/levodopa CR. Rayo gets medications a lot during the day and she states this is poor quality of life.    Dr. Renteria does not think Rayo will live for another 2 weeks. She wants to decrease her carbidopa/levodopa to help the dyskinesia's. She requests to have Dr. Cardoza talk to her for just a couple minutes to discuss the situation directly with him.    Per message from Dr. Cardoza:  I don't have specific recommendations on tapering just that they probably should do slowly over a week or two and see how it goes.     (G20) Parkinson's disease (H)  (primary encounter diagnosis)   Long acting Carbidopa/levodopa Sinemet 50/200: 1 tab 4/day @4am, 10am, 3pm and 8pm   short acting sinemet 25/100: 1/2 tab 4/day  @ 8am, @ 1pm, 6pm and  @ 10pm

## 2022-04-27 NOTE — LETTER
To [facility],        Per Dr. Marcelo Cardoza, we have discontinued Exelon patches due to the side effects Rayo was experiencing. We have switched the prescription to Exelon (rivastigmine) capsules. She can stop the patch and start the capsules at the following dosing regimen:  1.5 mg twice daily x 2 weeks  3 mg twice daily x 2 weeks  4.5 mg twice daily x 2 weeks  6 mg twice daily x 2 weeks      Please contact us with any questions regarding this change.         July Quan, Pharm.D.  Phone: [phone]      Sarah Roldan RN and Marcelo Cardoza MD  Phone: [phone]               9

## 2022-05-27 NOTE — Clinical Note
"5/15/2018       RE: Eduarda Lazar  [address] [place] [id number]     Dear Colleague,    Thank you for referring your patient, Eduarda Lazar, to the [facility] NEUROLOGY at [facility]. Please see a copy of my visit note below.    PATIENT: Eduarda Lazar    : 1940    ANDREE: May 15, 2018    REASON FOR VISIT: Parkinson's diease follow up & DBS interrogation & analysis.     HPI: Ms. Eduarda Lazar is a 78 year old *** handed *** female who came to the [facility] neurology clinic accompanied by her *** for a follow up visit.  She was last seen in the clinic on 2018 by Dr. Cardoza for a routine f/u visit.  During that visit, the following were discussed: -    Thus the major issues are   1. Cognition and the rivastigmine patch  2. Anxiety and remeron dose of 1/2 of 15mg at night.   3. Movement - sinemet dose and timing and dbs battery  4. UTI that will be treated     Return to see Margarita in a few months      Marcelo Cardoza MD     She had her DBS generator replaced on 3/2/2018.     She lives in [place], [place]    Since she is not living at home, she not too happy.  \"But they treat me well.\"    She is using a walker to walk.  No falls.    She reports shaking when she gets nervous.  She is getting PRN Sinemet, which is controlling her symptoms.     She reports anxiety is controlled.       PD Medications 04 am 10 am 3 pm 10 pm PRN *** ***   Sinemet 25/100 mg  1/2 1 1 1/2 1/2 tab x 2     Sinemet 50/200 mg  1  1 1        MEDICATIONS:   Outpatient Prescriptions Marked as Taking for the 5/15/18 encounter (Office Visit) with Clinton Zepeda APRN CNP   Medication Sig     acetaminophen (TYLENOL) 325 MG tablet Take 1 tablet (325 mg) by mouth every 4 hours as needed for pain     carbidopa-levodopa (SINEMET CR)  MG per CR tablet Take 1 tablet by mouth every 6 hours At 4 am, 10 am, 3 pm, and 10 pm     carbidopa-levodopa (SINEMET) " " MG per tablet Take 1/2 tab at 4 am, 1 tablet at 10 am, & 3 pm, and 1/2 tab 10 pm.     cephALEXin (KEFLEX) 500 MG capsule Take 1 capsule (500 mg) by mouth 2 times daily     Lutein 20 MG TABS Take 20 mg by mouth daily     mirtazapine (REMERON) 15 MG tablet Take 0.5 tablets (7.5 mg) by mouth At Bedtime     rivastigmine (EXELON) 1.5 MG capsule 1.5 mg 2/DAY X 2 WKS; THEN 3 MG 2/DAY X 2 WKS THEN 4.5MG 2/DAY x 2wks THEN 6MG 2/DAY     traMADol (ULTRAM) 50 MG tablet Take 1 tablet (50 mg) by mouth every 6 hours as needed for pain       ALLERGIES: Barbiturates; Camphor; Comtan; Exelon [rivastigmine]; Oxycodone; Vancomycin; Clindamycin hcl; and Sulfa drugs      PHYSICAL EXAM:    VITAL SIGNS:  Blood pressure 101/56, pulse 69, height 1.702 m (5' 7\"), weight 78.8 kg (173 lb 11.2 oz), not currently breastfeeding., Body mass index is 27.21 kg/(m^2).    GENERAL:  Ms. Lazar is a pleasant  female who is well-groomed and well-developed sitting comfortably in the exam room without any distress.  Affect is appropriate.    MOVEMENT DISORDERS ASSESSMENT: (Last Sinemet was about *** hrs ago)  Speech: Slight loss of expression and/or volume.  Facial Expression: Slight, abnormal diminution of facial expression.  Rest Tremor: Absent.   Dyskinesias:  Mild in Lt body; LUE  LLE.   Action/Postural Tremor: Absent.   Rigidity: Absent.   Finger Taps: Mild slowing and/or reduction in amplitude bilaterally.   Hand opening & closing: Mild slowing and/or reduction in amplitude bilaterally; Lt > Rt.   Hand pronation & supination: Moderately impaired; early fatiguing; occasional arrests in movement bilaterally.   Leg Agility: Moderately impaired; early fatiguing; occasional arrests in movement in Lt; dyskinesias interfering with tapping in Lt leg.   Body Bradykinesia: {Body Bradykinesia:9649602}    DBS Interrogation & Analysis:     Implanted generator:  Bilateral chest Activa-SC.  Lead placement:  Bilateral Subthalamic Nucleus " (STN).     Left STN     Therapy On:  100%  Battery Service Life:  OK.  2. 97 volts.     C +, 1 -  Volts: 2.6 volts  PW: 120 msec  Rate: 160 Hz     Electrode Impedance Check: (Amplitude 3.0 volts: PW 80 msec: Rate 100 Hz)   Left Lead: No Problems Found.      Right STN  2 +, 3 -  Volts: 2.8 volts  PW: 90 msec  Rate: 185 Hz     Therapy On:  100%  Battery Service Life:  OK.  2. 88 volts.     Electrode Impedance Check:  (Amplitude 3.0 volts: PW 80 msec: Rate 100 Hz)   Right Lead: No Problems Found.     See scanned report for impedance details.    ASSESSMENT:    Parkinson's Disease:  Patient has a *** year history of PD.      PLAN:      __ Your DBS electrical system function (impedance) is normal. The battery life is also good.  __  Stay on the same antiparkinsonian medications.  __  Stay on Rivastigmine for memory.  __  Continue on Remeron for anxiety.   __ For DBS related questions, [employer] Support Line is [phone].  __  Return in 4 - 6 months to see Dr. Cardoza.     Will return to our clinic in *** months or sooner as needed.    The total time spent with the patient was *** minutes, and greater than 50% of this time was spent in counseling and coordination of care.    MIRZA Donnelly,  CNP  [facility]    12:23 PM    Again, thank you for allowing me to participate in the care of your patient.      Sincerely,    MIRZA Oneill CNP     Patient

## (undated) DEVICE — SU DERMABOND ADVANCED .7ML DNX12

## (undated) DEVICE — SOL NACL 0.9% IRRIG 1000ML BOTTLE 2F7124

## (undated) DEVICE — SU ETHIBOND 2-0 SHDA 30" X563H

## (undated) DEVICE — DECANTER VIAL 2006S

## (undated) DEVICE — BASIN SET SINGLE STERILE 13752-624

## (undated) DEVICE — SU MONOCRYL 4-0 PS-2 27" UND Y426H

## (undated) DEVICE — SYR 10ML LL W/O NDL 302995

## (undated) DEVICE — BLADE ESU PLASMABLADE SPATULA TIP 4MM PS200-040

## (undated) DEVICE — SOL WATER IRRIG 1000ML BOTTLE 2F7114

## (undated) DEVICE — LINEN TOWEL PACK X5 5464

## (undated) DEVICE — LINEN TOWEL PACK X6 WHITE 5487

## (undated) DEVICE — PAD CHUX UNDERPAD 23X24" 7136

## (undated) DEVICE — SUCTION TIP YANKAUER STR K87

## (undated) DEVICE — PREP CHLORAPREP 26ML TINTED ORANGE  260815

## (undated) DEVICE — DRAPE POUCH INSTRUMENT 1018

## (undated) DEVICE — PREP SKIN SCRUB TRAY 4461A

## (undated) DEVICE — BASIN EMESIS STERILE  SSK9005A

## (undated) DEVICE — ESU CORD BIPOLAR GREEN 10-4000

## (undated) DEVICE — ESU ELEC BLADE 2.75" COATED/INSULATED E1455

## (undated) DEVICE — PREP POVIDONE IODINE SOLUTION 10% 120ML

## (undated) DEVICE — PREP POVIDONE IODINE SCRUB 7.5% 120ML

## (undated) DEVICE — SU VICRYL 3-0 SH 8X18" UND J864D

## (undated) DEVICE — DRAPE LAP PEDS DISP 29492

## (undated) DEVICE — GLOVE PROTEXIS MICRO 7.5  2D73PM75

## (undated) DEVICE — SUCTION MANIFOLD DORNOCH ULTRA CART UL-CL500

## (undated) DEVICE — BLADE KNIFE SURG 10 371110

## (undated) DEVICE — COVER CAMERA VIDEO LASER 9X96" 04-CC227

## (undated) DEVICE — ESU GROUND PAD ADULT W/CORD E7507

## (undated) DEVICE — DRAPE IOBAN INCISE 13X13" 6640EZ

## (undated) DEVICE — Device

## (undated) DEVICE — PREP CHLORAPREP CLEAR 3ML 260400

## (undated) DEVICE — GLOVE PROTEXIS BLUE W/NEU-THERA 8.0  2D73EB80

## (undated) RX ORDER — IPRATROPIUM BROMIDE AND ALBUTEROL SULFATE 2.5; .5 MG/3ML; MG/3ML
SOLUTION RESPIRATORY (INHALATION)
Status: DISPENSED
Start: 2018-03-02

## (undated) RX ORDER — ROCURONIUM BROMIDE 50 MG/5 ML
SYRINGE (ML) INTRAVENOUS
Status: DISPENSED
Start: 2018-03-02

## (undated) RX ORDER — ACETAMINOPHEN 325 MG/1
TABLET ORAL
Status: DISPENSED
Start: 2018-03-02

## (undated) RX ORDER — EPHEDRINE SULFATE 50 MG/ML
INJECTION, SOLUTION INTRAMUSCULAR; INTRAVENOUS; SUBCUTANEOUS
Status: DISPENSED
Start: 2018-03-02

## (undated) RX ORDER — FENTANYL CITRATE 50 UG/ML
INJECTION, SOLUTION INTRAMUSCULAR; INTRAVENOUS
Status: DISPENSED
Start: 2018-03-02

## (undated) RX ORDER — LIDOCAINE HYDROCHLORIDE 20 MG/ML
INJECTION, SOLUTION EPIDURAL; INFILTRATION; INTRACAUDAL; PERINEURAL
Status: DISPENSED
Start: 2018-03-02

## (undated) RX ORDER — PROPOFOL 10 MG/ML
INJECTION, EMULSION INTRAVENOUS
Status: DISPENSED
Start: 2018-03-02

## (undated) RX ORDER — BACITRACIN 50000 [IU]/1
INJECTION, POWDER, FOR SOLUTION INTRAMUSCULAR
Status: DISPENSED
Start: 2018-03-02

## (undated) RX ORDER — CEFAZOLIN SODIUM 2 G/100ML
INJECTION, SOLUTION INTRAVENOUS
Status: DISPENSED
Start: 2018-03-02

## (undated) RX ORDER — LIDOCAINE HYDROCHLORIDE AND EPINEPHRINE 10; 10 MG/ML; UG/ML
INJECTION, SOLUTION INFILTRATION; PERINEURAL
Status: DISPENSED
Start: 2018-03-02

## (undated) RX ORDER — PHENYLEPHRINE HCL IN 0.9% NACL 1 MG/10 ML
SYRINGE (ML) INTRAVENOUS
Status: DISPENSED
Start: 2018-03-02

## (undated) RX ORDER — BUPIVACAINE HYDROCHLORIDE 2.5 MG/ML
INJECTION, SOLUTION EPIDURAL; INFILTRATION; INTRACAUDAL
Status: DISPENSED
Start: 2018-03-02

## (undated) RX ORDER — ONDANSETRON 2 MG/ML
INJECTION INTRAMUSCULAR; INTRAVENOUS
Status: DISPENSED
Start: 2018-03-02

## (undated) RX ORDER — ALBUTEROL SULFATE 0.83 MG/ML
SOLUTION RESPIRATORY (INHALATION)
Status: DISPENSED
Start: 2018-03-02

## (undated) RX ORDER — DEXAMETHASONE SODIUM PHOSPHATE 4 MG/ML
INJECTION, SOLUTION INTRA-ARTICULAR; INTRALESIONAL; INTRAMUSCULAR; INTRAVENOUS; SOFT TISSUE
Status: DISPENSED
Start: 2018-03-02

## (undated) RX ORDER — GLYCOPYRROLATE 0.2 MG/ML
INJECTION, SOLUTION INTRAMUSCULAR; INTRAVENOUS
Status: DISPENSED
Start: 2018-03-02